# Patient Record
Sex: FEMALE | Race: BLACK OR AFRICAN AMERICAN | Employment: UNEMPLOYED | URBAN - METROPOLITAN AREA
[De-identification: names, ages, dates, MRNs, and addresses within clinical notes are randomized per-mention and may not be internally consistent; named-entity substitution may affect disease eponyms.]

---

## 2018-06-20 ENCOUNTER — OFFICE VISIT (OUTPATIENT)
Dept: PODIATRY | Age: 43
End: 2018-06-20
Payer: COMMERCIAL

## 2018-06-20 VITALS — WEIGHT: 190 LBS | RESPIRATION RATE: 16 BRPM | HEIGHT: 68 IN | BODY MASS INDEX: 28.79 KG/M2 | HEART RATE: 68 BPM

## 2018-06-20 DIAGNOSIS — M79.605 PAIN IN BOTH LOWER EXTREMITIES: ICD-10-CM

## 2018-06-20 DIAGNOSIS — M79.604 PAIN IN BOTH LOWER EXTREMITIES: ICD-10-CM

## 2018-06-20 DIAGNOSIS — B35.1 DERMATOPHYTOSIS OF NAIL: Primary | ICD-10-CM

## 2018-06-20 DIAGNOSIS — B35.3 TINEA PEDIS OF RIGHT FOOT: ICD-10-CM

## 2018-06-20 DIAGNOSIS — R26.2 WALKING DIFFICULTY DUE TO ANKLE AND FOOT: ICD-10-CM

## 2018-06-20 PROCEDURE — G8428 CUR MEDS NOT DOCUMENT: HCPCS | Performed by: PODIATRIST

## 2018-06-20 PROCEDURE — 4004F PT TOBACCO SCREEN RCVD TLK: CPT | Performed by: PODIATRIST

## 2018-06-20 PROCEDURE — G8419 CALC BMI OUT NRM PARAM NOF/U: HCPCS | Performed by: PODIATRIST

## 2018-06-20 PROCEDURE — 11721 DEBRIDE NAIL 6 OR MORE: CPT | Performed by: PODIATRIST

## 2018-06-20 PROCEDURE — 99203 OFFICE O/P NEW LOW 30 MIN: CPT | Performed by: PODIATRIST

## 2018-06-20 RX ORDER — CLOTRIMAZOLE AND BETAMETHASONE DIPROPIONATE 10; .64 MG/G; MG/G
CREAM TOPICAL
Qty: 45 G | Refills: 2 | Status: SHIPPED | OUTPATIENT
Start: 2018-06-20

## 2018-07-12 ENCOUNTER — OFFICE VISIT (OUTPATIENT)
Dept: FAMILY MEDICINE CLINIC | Age: 43
End: 2018-07-12
Payer: COMMERCIAL

## 2018-07-12 VITALS
SYSTOLIC BLOOD PRESSURE: 106 MMHG | TEMPERATURE: 98.6 F | WEIGHT: 191 LBS | HEART RATE: 93 BPM | HEIGHT: 66 IN | DIASTOLIC BLOOD PRESSURE: 70 MMHG | BODY MASS INDEX: 30.7 KG/M2

## 2018-07-12 DIAGNOSIS — I63.9 CEREBROVASCULAR ACCIDENT (CVA), UNSPECIFIED MECHANISM (HCC): Primary | ICD-10-CM

## 2018-07-12 PROCEDURE — 99213 OFFICE O/P EST LOW 20 MIN: CPT | Performed by: STUDENT IN AN ORGANIZED HEALTH CARE EDUCATION/TRAINING PROGRAM

## 2018-07-12 PROCEDURE — 99203 OFFICE O/P NEW LOW 30 MIN: CPT | Performed by: STUDENT IN AN ORGANIZED HEALTH CARE EDUCATION/TRAINING PROGRAM

## 2018-07-12 PROCEDURE — G8598 ASA/ANTIPLAT THER USED: HCPCS | Performed by: STUDENT IN AN ORGANIZED HEALTH CARE EDUCATION/TRAINING PROGRAM

## 2018-07-12 PROCEDURE — G8427 DOCREV CUR MEDS BY ELIG CLIN: HCPCS | Performed by: STUDENT IN AN ORGANIZED HEALTH CARE EDUCATION/TRAINING PROGRAM

## 2018-07-12 PROCEDURE — 1036F TOBACCO NON-USER: CPT | Performed by: STUDENT IN AN ORGANIZED HEALTH CARE EDUCATION/TRAINING PROGRAM

## 2018-07-12 PROCEDURE — G8417 CALC BMI ABV UP PARAM F/U: HCPCS | Performed by: STUDENT IN AN ORGANIZED HEALTH CARE EDUCATION/TRAINING PROGRAM

## 2018-07-12 RX ORDER — ASPIRIN 81 MG/1
81 TABLET ORAL DAILY
Qty: 30 TABLET | Refills: 3 | Status: SHIPPED | OUTPATIENT
Start: 2018-07-12 | End: 2019-09-17 | Stop reason: SDUPTHER

## 2018-07-12 ASSESSMENT — ENCOUNTER SYMPTOMS
SORE THROAT: 0
DIARRHEA: 0
NAUSEA: 0
SHORTNESS OF BREATH: 0
WHEEZING: 0
RHINORRHEA: 0
ABDOMINAL PAIN: 0
TROUBLE SWALLOWING: 0
ABDOMINAL DISTENTION: 0
APNEA: 0
VOMITING: 0
CHEST TIGHTNESS: 0
CONSTIPATION: 0
COUGH: 0
BLOOD IN STOOL: 0
EYE PAIN: 0

## 2018-07-12 ASSESSMENT — PATIENT HEALTH QUESTIONNAIRE - PHQ9
2. FEELING DOWN, DEPRESSED OR HOPELESS: 1
SUM OF ALL RESPONSES TO PHQ9 QUESTIONS 1 & 2: 1
1. LITTLE INTEREST OR PLEASURE IN DOING THINGS: 0
SUM OF ALL RESPONSES TO PHQ QUESTIONS 1-9: 1

## 2018-07-12 NOTE — PATIENT INSTRUCTIONS
Thank you for letting us take care of you today. We hope all your questions were addressed. If a question was overlooked or something else comes to mind after you return home, please contact a member of your Care Team listed below. Please make sure you have a routine office visit set up to follow-up on [address]. Your Care Team at [place] [address] is Team #4  Mac Couch MD (Faculty)  MD Caitlyn Ambriz MD (Resident)  Amrik Huizar MD (Resident)  Coby Escamilla MD (Resident)  Jonh Abbott MD (Resident)  Katie Guzman MD (Resident)  SVITLANA Lucio., AVA Shaffer., AVA Azar ([facility])  Malka Sky RN, ([id number] [facility])  Kenny Miller, Ph.D., (Behavioral Services)  Raymond Hicks, [address] [place] [address] (Clinical Pharmacist)       Office phone number: [phone]    If you need to get in right away due to illness, please be advised we have \"Same Day\" appointments available Monday-Friday. Please call us at [phone] option #3 to schedule your \"Same Day\" appointment.

## 2018-07-12 NOTE — PROGRESS NOTES
Heart Disease Mother     No Known Problems Father     No Known Problems Sister     No Known Problems Brother        Objective:    /70 (Site: Left Arm, Position: Sitting, Cuff Size: Medium Adult) Comment: MAchine  Pulse 93   Temp 98.6 °F (37 °C) (Oral)   Ht 5' 5.75\" (1.67 m)   Wt 191 lb (86.6 kg)   BMI 31.06 kg/m²    BP Readings from Last 3 Encounters:   07/12/18 106/70       Physical Exam   Constitutional: She is oriented to person, place, and time. She appears well-developed and well-nourished. No distress. HENT:   Head: Normocephalic and atraumatic. Right Ear: External ear normal.   Left Ear: External ear normal.   Eyes: Pupils are equal, round, and reactive to light. No scleral icterus. Cardiovascular: Normal rate, regular rhythm and normal heart sounds. Pulmonary/Chest: Effort normal and breath sounds normal. No respiratory distress. She has no wheezes. Abdominal: Soft. Bowel sounds are normal. She exhibits no distension. There is no tenderness. Musculoskeletal: She exhibits no edema. Lymphadenopathy:     She has no cervical adenopathy. Neurological: She is alert and oriented to person, place, and time. No cranial nerve deficit. She exhibits abnormal muscle tone (decreased tone on right side of the body). Coordination abnormal.   Skin: Skin is warm and dry. She is not diaphoretic. Psychiatric: She has a normal mood and affect. Nursing note and vitals reviewed. Lab Results   Component Value Date    WBC 5.0 02/10/2012    HGB 11.6 (L) 02/10/2012    HCT 35.3 (L) 02/10/2012     02/10/2012     02/10/2012    K 4.2 02/10/2012     (H) 02/10/2012    CREATININE 0.63 02/10/2012    BUN 8 02/10/2012    CO2 21 02/10/2012    TSH 1.62 02/10/2012     Lab Results   Component Value Date    CALCIUM 9.4 02/10/2012     No results found for: LDLCALC, LDLCHOLESTEROL, LDLDIRECT    Assessment and Plan:    1.  Cerebrovascular accident (CVA), unspecified mechanism ([facility] [address]  - [facility] - [facility]  - [facility]  - aspirin EC 81 MG EC tablet; Take 1 tablet by mouth daily  Dispense: 30 tablet; Refill: 3  - Lipid Panel  - Hemoglobin A1C          Requested Prescriptions     Pending Prescriptions Disp Refills    aspirin EC 81 MG EC tablet 30 tablet 3     Sig: Take 1 tablet by mouth daily       There are no discontinued medications. Opal Shepherd received counseling on the following healthy behaviors: nutrition, exercise and medication adherence    Discussed use, benefit, and side effects of prescribed medications. Barriers to medication compliance addressed. All patient questions answered. Pt voiced understanding. Return in about 4 weeks (around 8/9/2018) for follow up cholesterol. Disclaimer: Some or all of this note was transcribed using voice-recognition software. This may cause typographical errors occasionally. Although all effort is made to fix these errors, please do not hesitate to contact our office if there is any concern with the understanding of this note.

## 2018-07-18 ENCOUNTER — OFFICE VISIT (OUTPATIENT)
Dept: PODIATRY | Age: 43
End: 2018-07-18
Payer: COMMERCIAL

## 2018-07-18 VITALS — HEIGHT: 68 IN | HEART RATE: 68 BPM | RESPIRATION RATE: 16 BRPM | BODY MASS INDEX: 28.79 KG/M2 | WEIGHT: 190 LBS

## 2018-07-18 DIAGNOSIS — M79.604 PAIN IN BOTH LOWER EXTREMITIES: ICD-10-CM

## 2018-07-18 DIAGNOSIS — L60.0 INGROWN NAIL: ICD-10-CM

## 2018-07-18 DIAGNOSIS — B35.1 DERMATOPHYTOSIS OF NAIL: Primary | ICD-10-CM

## 2018-07-18 DIAGNOSIS — M79.605 PAIN IN BOTH LOWER EXTREMITIES: ICD-10-CM

## 2018-07-18 DIAGNOSIS — B35.3 TINEA PEDIS OF BOTH FEET: ICD-10-CM

## 2018-07-18 PROCEDURE — G8427 DOCREV CUR MEDS BY ELIG CLIN: HCPCS | Performed by: PODIATRIST

## 2018-07-18 PROCEDURE — G8598 ASA/ANTIPLAT THER USED: HCPCS | Performed by: PODIATRIST

## 2018-07-18 PROCEDURE — 1036F TOBACCO NON-USER: CPT | Performed by: PODIATRIST

## 2018-07-18 PROCEDURE — G8417 CALC BMI ABV UP PARAM F/U: HCPCS | Performed by: PODIATRIST

## 2018-07-18 PROCEDURE — 11721 DEBRIDE NAIL 6 OR MORE: CPT | Performed by: PODIATRIST

## 2018-07-20 ENCOUNTER — HOSPITAL ENCOUNTER (OUTPATIENT)
Dept: PHYSICAL THERAPY | Age: 43
Setting detail: THERAPIES SERIES
Discharge: HOME | End: 2018-07-20
Payer: COMMERCIAL

## 2018-07-20 ENCOUNTER — HOSPITAL ENCOUNTER (OUTPATIENT)
Dept: SPEECH THERAPY | Age: 43
Setting detail: THERAPIES SERIES
Discharge: HOME | End: 2018-07-20
Payer: COMMERCIAL

## 2018-07-20 DIAGNOSIS — I69.320 APHASIA AS LATE EFFECT OF CEREBROVASCULAR ACCIDENT: Primary | ICD-10-CM

## 2018-07-20 PROCEDURE — G8985 CARRY GOAL STATUS: HCPCS

## 2018-07-20 PROCEDURE — G9163 LANG EXPRESS GOAL STATUS: HCPCS

## 2018-07-20 PROCEDURE — G8984 CARRY CURRENT STATUS: HCPCS

## 2018-07-20 PROCEDURE — G9162 LANG EXPRESS CURRENT STATUS: HCPCS

## 2018-07-20 PROCEDURE — 97162 PT EVAL MOD COMPLEX 30 MIN: CPT

## 2018-07-20 PROCEDURE — 92523 SPEECH SOUND LANG COMPREHEN: CPT

## 2018-07-20 NOTE — PROGRESS NOTES
symmetry     Score: [] 0 [x] 1  Right and left step length not equal = 0  Right and left step length appear equal = 1    Step continuity     Score: [] 0 [x] 1  Stopping or discontinuity between steps = 0  Steps appear continuous = 1    Path (Excursion)     Score: [] 0 [] 1 [x] 2  Marked deviation = 0  Mild/moderate deviation or uses w. aid = 1  Straight without w. aid = 2    Trunk       Score: [] 0 [] 1 [x] 2  Marked sway or uses w. aid = 0  No sway but flex. knees or back or  uses arms for stability = 1  No sway, flex. , use of arms or w. aid = 2    Walking time     Score: [] 0 [x] 1  Heels apart = 0  Heels almost touching while walking = 1    Gait Total Score     Score:         12/12              TOTAL SCORE = BALANCE + GAIT  Score:        28 /28         Risk Indicators:   Score 18 or less = High risk of falls  Score 19-23 = Moderate risk of falls  Score 24 or more = Low risk of falls    Emilia ME, Corbin TF, Roddy R, Fall Risk Index for elderly patients based on number of chronic disabilities.   Am J Med [id number]

## 2018-07-20 NOTE — CONSULTS
average  2. Pt to increase ROM of R shoulder flex 135, abd 110, ER 50 Wrist ext 45 UD 25, Forearm pronation/supination 60  3. Pt to increase strength with shoulder flex/abd/er 4/5, IR 4+/5, elbow flex 5/5, ext 4+/5, forearm sup 4+/5 pro 4+/5 wrist flex 4-/5 ext 4/5 RD 4-/5 UD 4/5 EPL 3+/5 finger abd 4-/5,  10lb, pinch/lateral pinch 5lb   4. Pt to report decreased difficulty with reaching and grabbing an object. Will defer goals for Hand if OT ordered for PT. Patient goals: \"to get better\"    G-CODE    Functional Limitation: Carrying, Moving, and Handling objects  Functional Assessment Used: Therapist Discretion and Pt report  Current Status Modifier: CL  Goal Status Modifier: CK  Discharge Status Modifier:     Rehab Potential:  [] Good  [x] Fair  [] Poor   Suggested Professional Referral:  [] No  [x] Yes: Pt would benefit from OT to work on fine motor skills. Barriers to Goal Achievement[de-identified]  [x] No  [] Yes:  Domestic Concerns:  [x] No  [] Yes:    Pt. Education:  [x] Plans/Goals, Risks/Benefits discussed  [] Home exercise program  Method of Education: [x] Verbal  [] Demo  [] Written  Comprehension of Education: Pt informed of the areas that were weak and would be working on in PT   [x] [employer] understanding. [x] Demonstrates understanding. [] Needs Review. [] Demonstrates/verbalizes understanding of HEP/Ed previously given. Treatment Plan:  [x] Therapeutic Exercise    [x] Modalities:  [x] Therapeutic Activity    [x] Ultrasound  [x] Electrical Stimulation  [] Gait Training     [x] Massage      [] Lumbar/Cervical Traction  [x] Neuromuscular Re-education [x] Cold/hotpack [] Iontophoresis: 4 mg/mL  [x] Instruction in HEP             Dexamethasone Sodium  [x] Manual Therapy             Phosphate 40-80 mAmin  [] Aquatic Therapy    [] Dry Needling [] Other:    []  Medication allergies reviewed for use of    Dexamethasone Sodium Phosphate 4mg/ml     with iontophoresis treatments.    Pt is not

## 2018-07-23 ENCOUNTER — HOSPITAL ENCOUNTER (OUTPATIENT)
Dept: SPEECH THERAPY | Age: 43
Setting detail: THERAPIES SERIES
Discharge: HOME | End: 2018-07-23
Payer: COMMERCIAL

## 2018-07-23 ENCOUNTER — HOSPITAL ENCOUNTER (OUTPATIENT)
Dept: PHYSICAL THERAPY | Age: 43
Setting detail: THERAPIES SERIES
Discharge: HOME | End: 2018-07-23
Payer: COMMERCIAL

## 2018-07-23 PROCEDURE — 92507 TX SP LANG VOICE COMM INDIV: CPT

## 2018-07-23 PROCEDURE — 97110 THERAPEUTIC EXERCISES: CPT

## 2018-07-23 NOTE — FLOWSHEET NOTE
[] Rula Stephen       Outpatient Physical        Therapy       [address]       Phone: [phone]       Fax: [phone] [] [facility] at [address] [facility]       Phone: [phone]       Fax: [phone] [] [place]. [address] [place] [address]   Phone: [phone]   Fax:  [phone]     Physical Therapy Daily Treatment Note    Date:  2018  Patient Name:  Gonzalo Hanson    :  1975  MRN: [id number]  Physician: Bhakti Garcia MD, Harjinder Branham D.O. Insurance: Medicare  Medical Diagnosis: CVA, unspecified mechanism I63.9                 Rehab Codes: M62.81, M25.611, M25.621, M25.631, M25.641, M79.602, R29.3  Onset date: 10/1/2016                       Next 's appt. :   Visit# / total visits:   Cancels/No Shows: 0/0    Subjective:    Pain:  [x] Yes  [] No           Location: entire R UE  Pain Rating: (0-10 scale) 10/10 currently and on average  Pain altered Tx:  [x] No  [] Yes  Action:  Comments:  Patient arrived noting no change in pain or symptoms in R UE and demonstrating R UE flexion synergy. Objective:  Modalities:   Precautions:  Exercises:  Exercise Reps/ Time Weight/ Level Comments   Supine   cane   Chest press 10x     Protraction 10x     ER 10x           Standing      Wall slides 10x ea  Flexion, abduction. Finger ladders --  Flexion only attempted . Pt with difficulty abducting/adducting fingers, may add in as finger ab/aduction increases to perform with proper form. Seated      Mat towel slides 10x ea  Sammy UE.  Flexion, CW, CCW   Wrist maze 2x through  Patient provided self tactile cuing after demo by therapist to keep elbow tucked into side to keep motions at the wrist. 2nd attempt much more difficult than 1st.          Other:    Specific Instructions for next treatment:    Treatment Charges: Mins Units   []

## 2018-07-25 ENCOUNTER — HOSPITAL ENCOUNTER (OUTPATIENT)
Dept: PHYSICAL THERAPY | Age: 43
Setting detail: THERAPIES SERIES
Discharge: HOME | End: 2018-07-25
Payer: COMMERCIAL

## 2018-07-25 DIAGNOSIS — Z86.73 STATUS POST CVA: Primary | ICD-10-CM

## 2018-07-25 PROCEDURE — 97110 THERAPEUTIC EXERCISES: CPT

## 2018-08-01 ENCOUNTER — HOSPITAL ENCOUNTER (OUTPATIENT)
Dept: SPEECH THERAPY | Age: 43
Setting detail: THERAPIES SERIES
Discharge: HOME | End: 2018-08-01
Payer: COMMERCIAL

## 2018-08-01 ENCOUNTER — HOSPITAL ENCOUNTER (OUTPATIENT)
Dept: OCCUPATIONAL THERAPY | Age: 43
Setting detail: THERAPIES SERIES
Discharge: HOME | End: 2018-08-01
Payer: COMMERCIAL

## 2018-08-01 ENCOUNTER — HOSPITAL ENCOUNTER (OUTPATIENT)
Dept: PHYSICAL THERAPY | Age: 43
Setting detail: THERAPIES SERIES
Discharge: HOME | End: 2018-08-01
Payer: COMMERCIAL

## 2018-08-01 PROCEDURE — 97110 THERAPEUTIC EXERCISES: CPT

## 2018-08-01 PROCEDURE — 97165 OT EVAL LOW COMPLEX 30 MIN: CPT

## 2018-08-01 PROCEDURE — 92507 TX SP LANG VOICE COMM INDIV: CPT

## 2018-08-01 NOTE — CONSULTS
[] With Sedentary activity    Pain Altered Tx: []Yes []No  Action Taken:    Objective:  Tests/Measurements:  Current Functional Level:  93% functionally impaired as measured with the DASH Functional Survey. 0-100 scale, with 0 = no Deficits   STRENGTH      RIGHT LEFT    2.5 42   Lateral pinch 2.5 14   2 point pinch 1 8   3 jaw pinch Not able 10     DIGITS         Extension/Flexion   INDEX MIDDLE RING LITTLE   MCP +20/60 +30/65 +30/90 +30/92   PIP -10/80 -20/75 -23/75 -23/80   DIP +10/46 +10/42 +10/45 +15/75            COORDINATION  - Fine Motor UNABLE TO TEST AT THIS TIME     Right in seconds Percentile Left in seconds Percentile   9 Hole Peg Test         SHOULDER      Right ROM Right Strength   Flexion 83 4-/5   Extension 30 4-/5   Abduction 64 4-/5   Adduction 20 4-/5         Problems:     [x] Pain       [x] ROM      [x] Strength  [x] Function         [] Coordination    [] Sensation                Short Term Goals: (  9    Treatments)  1. Decrease Pain: to 6/10 with simple ADLS  2. Increase A/P ROM (degrees): of R Shoulder to 100 for increased I with ADLs  3. Increase strength (pounds): increase R  strength to 7 for increased I with [address] tasks  4. Increase function:  DASH score will be 80% functionally impaired or less  5. Independent with Home Exercise Program in 3 sessions      Long Term Goals: (  18  Treatments)  1. Pt to increase R UE ROM   1. All MCPs to 90 flexion  2. All DIPs to 90 flexion  3. All PIPs to 75 degrees flexion  4. Decrease extensor lag of all DIPs and PIPs to -5 or less  2. Increase R  strength to 15 for increased I with [address] tasks      Patient Goals: to get my hand back    Treatment Potential: [x]Good []Fair []Poor  Suggested Professional Referral: []Yes [x]No  Domestic Concerns: []Yes [x]No   Barriers to Goal Achievement: []Yes [x] No    Comments/Assessment: Pt presents s/p stroke resulting in R UE ataxia.  Pt demo distinct nerve/motor distribution and division of the middle and ring fingers. Pt demo claw hand pattern when grasping for small objects. Pt demo minimal tone in shoulder and elbow allowing for fluid movement at times. Pt demo decreased control distal to elbow in wrist and digits. Pt wishes to regain full function and return to working functional member of society. Pt could benefit from Valorie Sierra 3 for spasticity. BY signing this note you are in agreement to a PT doing DRY NEEDLING. Home Program Initiated:   [ x ] Written    [x] Verbal    [ x ] Demo   Comprehension of Education: [x] Yes [] No [] Needs Review  [x]Plans /[x] Goals, []Risks/ [] Benefits discussed with [x] Patient []Family    Treatment Plan:  Frequency/Duration:     2  Times a week, for    18  Visits. [x] Therapeutic Exercise 79876 [] Electrical Stim [id number]    [x]Neuro Re-Ed  54525  [x] Written Home Program    [] Iontophoresis 31165  [x] Therapeutic Activities 19825  [] Manual Therapy 00266  [x] Manual Therapy 78084  [] Ultrasound 93702   [] Hot Pack/Cold Pack 62574  [] Attended Electrical Stim 46773 [x] Massage 17352    [] Ortho Fit/Train [id number]  [] Ortho Re-Fit/Check  [id number]          Treatment This Date:  [x] Eval        Min. 15    [x]  Low Complexity   []  Moderate Complexity   []  High Complexity  []  Re-Evaluation Min. [x] Therapeutic Exercise         Min. 39   [] ADL                          Min.  [] Therapeutic Activity            Min.        Flow Sheet:  Exercise Reps/Time Weight/Level Comments   Cones, pronation/supination 10  Initiated this date   Grasp and release foam blocks 1/2 series  Initiated this date                               Evaluation Complexity:  History (Personal factors, comorbidities) [x]  0 []  1-2 []  3+   Exam (limitations, restrictions) [x]  1-2 []  3 []  4+   Decision Making [x]  Low []  Moderate []  High   ?  [x]  Low Complexity []  Moderate Complexity []  High Complexity               Total Treatment Time: 45    Time In: 0900   Time Out: 1000      Electronically signed by LAQUITA Wheeler, OTSANTANA/L on 8/1/2018 at 8:57 AM        Physician Signature: _________________________ Date: _______________  By signing above or cosigning this note, I have reviewed this plan of care and certify a need for medically necessary rehabilitation services.      *PLEASE SIGN ABOVE AND FAX BACK ALL PAGES*

## 2018-08-01 NOTE — FLOWSHEET NOTE
[x] [facility]       Outpatient Physical        Therapy       [address]       Phone: [phone]       Fax: [phone] [] [facility] at [address]       Phone: [phone]       Fax: [phone] [] [place]. [address] [place] [address] [facility]  [address] [place] [address]   Phone: [phone]   Fax:  [phone]     Physical Therapy Daily Treatment Note    Date:  2018  Patient Name:  Kala Yan    :  1975  MRN: [id number]  Physician: Lb Ivan MD, Patricia Syed DLuis FO. Insurance: Medicare  Medical Diagnosis: CVA, unspecified mechanism I63.9                 Rehab Codes: M62.81, M25.611, M25.621, M25.631, M25.641, M79.602, R29.3  Onset date: 10/1/2016                       Next 's appt. :   Visit# / total visits:   Cancels/No Shows: 0/0    Subjective:    Pain:  [x] Yes  [] No           Location: entire R UE  Pain Rating: (0-10 scale) 10/10 currently and on average  Pain altered Tx:  [x] No  [] Yes  Action:  Comments:  Patient is doing fair, reports of same constant pain. Objective:  Modalities:   Precautions:  Exercises:  Exercise Reps/ Time Weight/ Level Comments   Scifit  3 min  L1 Difficulty holding hand rails          Supine      Chest press 10x 2lb    protraction 10x 2lb    Shoulder Flex 10x 2 lb    Standing      Wall slides 10x ea  Flexion, abduction.    Ball roll on wall  10x  Flex   Finger ladder   8 levels  Difficulty with finger movement   Wand exercises      -Flex 10x 1lb    -abd 10x 1lb    -ER/IR  10x 1lb Cues to keeps elbows at sides    -bicep curl  10x 1lb          Seated      Mat towel slides 10x ea  With orange slider, Flexion, abd  CW, CCW   Wrist maze 2x through  Dropped maze 1x held    [employer] 1'x2 yellow Difficulty holding ball held   Other:    Specific Instructions for next treatment: Progress ROM and strengthening exercises, add elbow ext exercises and wrist ext exercises. Treatment Charges: Mins Units   []  Modalities     [x]  Ther Exercise 45 3   []  Manual Therapy     []  Ther Activities     []  Aquatics     []  Vasocompression     []  Other     Total Treatment time 45 3       Assessment: [x] Progressing toward goals. Pt demonstrating difficultly and some frustration with new exercise. Pt presenting with difficulty holding objects and finger movements. Pt having no difficulty with added weight during supine exercises. Pt needing verbal and tactile cues to achieve full elbow extension. [] No change. [] Other:    STG: (to be met in 10 treatments)  1. ? Pain: Pt to report decrease in pn in RUE to be 8/10 or less on average  2. ? ROM: Pt to increase ROM of R Shoulder flex 120, abd 90, ER 35, IR 80 ELBOW ext 5, WRIST flex 75 ext 35, RD 18, UD 15, Forearm pronation/supination 45   3. ? Strength: Pt to increase strength with shoulder flex/abd/er 4-/5, IR 4/5, elbow flex 4+/5, ext 4/5, forearm sup 4/5 pro 4-/5 wrist flex 3+/5 ext 4-/5 RD 3+/5 UD 4-/5 EPL 3/5 finger abd 3+/5,  5lb, pinch/lateral pinch 1lb  4. ? Function: Pt to report increased use of B UE with functional tasks at home such as cooking  5. Independent with Home Exercise Programs  6. Pt to demonstrate and verbalize correct posture  LTG: (to be met in 20 treatments)  1. Pt to report decreased in pn in RUE to be 6/10 or less on average  2. Pt to increase ROM of R shoulder flex 135, abd 110, ER 50 Wrist ext 45 UD 25, Forearm pronation/supination 60  3. Pt to increase strength with shoulder flex/abd/er 4/5, IR 4+/5, elbow flex 5/5, ext 4+/5, forearm sup 4+/5 pro 4+/5 wrist flex 4-/5 ext 4/5 RD 4-/5 UD 4/5 EPL 3+/5 finger abd 4-/5,  10lb, pinch/lateral pinch 5lb   4. Pt to report decreased difficulty with reaching and grabbing an object. G-CODE     Functional Limitation: Carrying, Moving, and Handling objects  Functional Assessment Used:  Therapist Discretion and Pt report  Current Status Modifier: CL  Goal Status Modifier: CK  Discharge Status Modifier:       Pt. Education:  [x] Yes  [] No  [] Reviewed Prior HEP/Ed  Method of Education: [x] Verbal  [x] Demo  [] Written  Comprehension of Education: Educated Pt that she could use her exercise balls to squeeze at home to work on  strength and use a towel or pillow case for wall slides. [x] Verbalizes understanding. [] Demonstrates understanding. [x] Needs review. [] Demonstrates/verbalizes HEP/Ed previously given. Plan: [x] Continue per plan of care. [] Other:        Frequency: 3 x/weeks for 20 visits    Time In: 1000          Time Out: 1045    Electronically signed by Enoch Bella.  MARTY Abbott on 8/1/2018 at 11:17 AM

## 2018-08-03 ENCOUNTER — HOSPITAL ENCOUNTER (OUTPATIENT)
Dept: OCCUPATIONAL THERAPY | Age: 43
Setting detail: THERAPIES SERIES
Discharge: HOME | End: 2018-08-03
Payer: COMMERCIAL

## 2018-08-03 ENCOUNTER — HOSPITAL ENCOUNTER (OUTPATIENT)
Dept: PHYSICAL THERAPY | Age: 43
Setting detail: THERAPIES SERIES
Discharge: HOME | End: 2018-08-03
Payer: COMMERCIAL

## 2018-08-03 PROCEDURE — 97110 THERAPEUTIC EXERCISES: CPT

## 2018-08-03 NOTE — FLOWSHEET NOTE
fairly good placement and control; hand control deficit is more pronounced. Specific Instructions for Next Treatment:    Treatment Charges: Mins Units   []  Modalities     [x]  Ther Exercise 50 3   []  Manual Therapy     []  Ther Activities     []  Orthotic fitting/training     []  Orthotic re-check     []  Other         Assessment: [x] Progressing toward goals. [] No change. [] Other:    Short Term Goals: (  9    Treatments)  1. Decrease Pain: to 6/10 with simple ADLS  2. Increase A/P ROM (degrees): of R Shoulder to 100 for increased I with ADLs  3. Increase strength (pounds): increase R  strength to 7 for increased I with [facility] tasks  4. Increase function:  DASH score will be 80% functionally impaired or less  5. Independent with Home Exercise Program in 3 sessions        Long Term Goals: (  18  Treatments)  1. Pt to increase R UE ROM         1. All MCPs to 90 flexion  2. All DIPs to 90 flexion  3. All PIPs to 75 degrees flexion  4. Decrease extensor lag of all DIPs and PIPs to -5 or less  2. Increase R  strength to 15 for increased I with [facility] tasks        Patient Goals: To get my hand back    Pt. Education:  [x] Yes  [] No  [x] Reviewed Prior HEP/Ed  Method of Education: [x] Verbal  [x] Demo  [] Written  Re:  Comprehension of Education:  [] Verbalizes understanding. [x] Demonstrates understanding. [] Needs review. [x] Demonstrates/verbalizes HEP/Ed previously given. Treatment Plan: 2 times a week for 18 visits to improve gross/fine motor coordination of RUE. Time In/Out: 1305 - 1355  Total Treatment Time:  48  Min.       Electronically signed by:  AYANA Ortega/CHOCO, JUSTINO

## 2018-08-03 NOTE — FLOWSHEET NOTE
3+/5 finger abd 4-/5,  10lb, pinch/lateral pinch 5lb   4. Pt to report decreased difficulty with reaching and grabbing an object. G-CODE     Functional Limitation: Carrying, Moving, and Handling objects  Functional Assessment Used: Therapist Discretion and Pt report  Current Status Modifier: CL  Goal Status Modifier: CK  Discharge Status Modifier:       Pt. Education:  [x] Yes  [] No  [x] Reviewed Prior HEP/Ed  Method of Education: [x] Verbal  [] Demo  [] Written  Comprehension of Education: Educated Pt that she could use her exercise balls to squeeze at home to work on  strength and use a towel or pillow case for wall slides. [x] Verbalizes understanding. [] Demonstrates understanding. [x] Needs review. [] Demonstrates/verbalizes HEP/Ed previously given. Plan: [x] Continue per plan of care.    [] Other:        Frequency: 3 x/weeks for 20 visits    Time In: 1400 Time Out: 1500    Electronically signed by Donovan Garcia PT on 8/3/2018 at 2:02 PM

## 2018-08-06 ENCOUNTER — HOSPITAL ENCOUNTER (OUTPATIENT)
Age: 43
Setting detail: SPECIMEN
Discharge: HOME | End: 2018-08-06
Payer: COMMERCIAL

## 2018-08-06 LAB
CHOLESTEROL/HDL RATIO: 3
CHOLESTEROL: 124 MG/DL
ESTIMATED AVERAGE GLUCOSE: 100 MG/DL
HBA1C MFR BLD: 5.1 % (ref 4–6)
HDLC SERPL-MCNC: 42 MG/DL
LDL CHOLESTEROL: 65 MG/DL (ref 0–130)
TRIGL SERPL-MCNC: 83 MG/DL
VLDLC SERPL CALC-MCNC: NORMAL MG/DL (ref 1–30)

## 2018-08-07 ENCOUNTER — OFFICE VISIT (OUTPATIENT)
Dept: FAMILY MEDICINE CLINIC | Age: 43
End: 2018-08-07
Payer: COMMERCIAL

## 2018-08-07 VITALS
SYSTOLIC BLOOD PRESSURE: 113 MMHG | BODY MASS INDEX: 28.92 KG/M2 | WEIGHT: 190.8 LBS | TEMPERATURE: 97.9 F | HEART RATE: 85 BPM | DIASTOLIC BLOOD PRESSURE: 70 MMHG | HEIGHT: 68 IN

## 2018-08-07 DIAGNOSIS — Z86.73 STATUS POST CVA: Primary | ICD-10-CM

## 2018-08-07 PROCEDURE — G8427 DOCREV CUR MEDS BY ELIG CLIN: HCPCS | Performed by: STUDENT IN AN ORGANIZED HEALTH CARE EDUCATION/TRAINING PROGRAM

## 2018-08-07 PROCEDURE — 1036F TOBACCO NON-USER: CPT | Performed by: STUDENT IN AN ORGANIZED HEALTH CARE EDUCATION/TRAINING PROGRAM

## 2018-08-07 PROCEDURE — 99213 OFFICE O/P EST LOW 20 MIN: CPT | Performed by: STUDENT IN AN ORGANIZED HEALTH CARE EDUCATION/TRAINING PROGRAM

## 2018-08-07 PROCEDURE — G8598 ASA/ANTIPLAT THER USED: HCPCS | Performed by: STUDENT IN AN ORGANIZED HEALTH CARE EDUCATION/TRAINING PROGRAM

## 2018-08-07 PROCEDURE — G8417 CALC BMI ABV UP PARAM F/U: HCPCS | Performed by: STUDENT IN AN ORGANIZED HEALTH CARE EDUCATION/TRAINING PROGRAM

## 2018-08-07 NOTE — PROGRESS NOTES
Subjective:    Ruthy Regalado is a 37 y.o. female with  has a past medical history of Chronic back pain; Obesity; and Stroke ([address] Presented to the office today for:  Chief Complaint   Patient presents with    Follow-up     cholesterol    Other     needs letter to [place] for fan for pathway crisis       HPI    Patient is here today to f/u on lab work and PT/Speech Therapy which was initiated after her last visit in regards to her CVA in 2016. Patient is on an aspirin. Her BP is within normal limits and her lipid panel and a1c yesterday were unremarkable. Also requesting fan due to humidity. Review of Systems  Constitutional: Positive for activity change. Negative for chills, diaphoresis, fatigue and fever. HENT: Positive for drooling. Negative for rhinorrhea, sore throat and trouble swallowing. Eyes: Negative for pain and visual disturbance. Respiratory: Negative for apnea, cough, chest tightness, shortness of breath and wheezing. Cardiovascular: Negative for chest pain, palpitations and leg swelling. Gastrointestinal: Negative for abdominal distention, abdominal pain, blood in stool, constipation, diarrhea, nausea and vomiting. Genitourinary: Negative for dysuria, flank pain and hematuria. Musculoskeletal: Negative for arthralgias and myalgias. Neurological: Positive for speech difficulty and weakness (right sided). Negative for dizziness, seizures, syncope, facial asymmetry, light-headedness, numbness and headaches. Psychiatric/Behavioral: Negative for agitation, confusion and sleep disturbance. The patient is not nervous/anxious.               The patient has a   Family History   Problem Relation Age of Onset    Hearing Loss Mother     Diabetes Mother     High Blood Pressure Mother     Heart Disease Mother     No Known Problems Father     No Known Problems Sister     No Known Problems Brother        Objective:    /70 (Site: Left Arm, Position: Sitting, Cuff Size: Large Prescriptions      No prescriptions requested or ordered in this encounter       There are no discontinued medications. Lila Castillo received counseling on the following healthy behaviors: nutrition, exercise and medication adherence    Discussed use, benefit, and side effects of prescribed medications. Barriers to medication compliance addressed. All patient questions answered. Pt voiced understanding. Return in about 6 months (around 2/7/2019) for health maint, health maintenance. Disclaimer: Some or all of this note was transcribed using voice-recognition software. This may cause typographical errors occasionally. Although all effort is made to fix these errors, please do not hesitate to contact our office if there is any concern with the understanding of this note.

## 2018-08-07 NOTE — PROGRESS NOTES
Visit Information    Have you changed or started any medications since your last visit including any over-the-counter medicines, vitamins, or herbal medicines? no   Have you stopped taking any of your medications? Is so, why? -  no  Are you having any side effects from any of your medications? - no    Have you seen any other physician or provider since your last visit?  no   Have you had any other diagnostic tests since your last visit?  no   Have you been seen in the emergency room and/or had an admission in a hospital since we last saw you?  no   Have you had your routine dental cleaning in the past 6 months?  no     Do you have an active MyChart account? If no, what is the barrier?   No: pending    Patient Care Team:  Donovan Liz MD as PCP - General (Family Medicine)  Param Chahal DPM as Physician (Podiatry)    Medical History Review  Past Medical, Family, and Social History reviewed and does not contribute to the patient presenting condition    Health Maintenance   Topic Date Due    HIV screen  04/14/1990    DTaP/Tdap/Td vaccine (1 - Tdap) 04/14/1994    Cervical cancer screen  04/14/1996    Flu vaccine (1) 09/01/2018    Diabetes screen  08/06/2021    Lipid screen  08/06/2023

## 2018-08-08 ENCOUNTER — HOSPITAL ENCOUNTER (OUTPATIENT)
Dept: OCCUPATIONAL THERAPY | Age: 43
Setting detail: THERAPIES SERIES
Discharge: HOME | End: 2018-08-08
Payer: COMMERCIAL

## 2018-08-08 PROCEDURE — 97110 THERAPEUTIC EXERCISES: CPT

## 2018-08-08 NOTE — FLOWSHEET NOTE
[x] [facility]       Occupational Therapy             1st floor       [address], [place]         Phone: [phone]       Fax: [phone] [] [address] [facility] at            [address] [facility] , [address] [facility]     Phone: [phone]     Fax: [phone]      Occupational Therapy Daily Treatment Note    Date:  2018  Patient Name:  Tifafnie Bateman    :  1975  MRN: [id number]  Physician: Paul Rosario            Insurance: [address] [phone]---BH/ZW/ZP No precert required for initial eval and first 30 visits. For re eval and additional visits, precert required, phone [phone]. Calendar year. Not combined                Medical Diagnosis: Z86.73 S/P CVA  Rehab Codes: FM Skills Loss R29.818, Lack of coordination R27.8    Onset Date: 10-                      Next Dr. Lorena Nguyen:  Visit# / Total Visits: 3/16  Cancels/No Shows: 0/0    Subjective:    Pain:  [x] Yes  [] No Location: Entire RUE Pain Rating: (0-10 scale) 8/10  Pain altered Tx:  [x] No  [] Yes  Action: NA  Pt Comments: Pt unable to describe pain other than location. Objective:  Modalities: NA    Exercises:                                        Flow Sheet:  Exercise Reps/Time Weight/Level Comments   Cones, pronation/supination 10   Completed   Grasp and release foam cubes 1/2 series   Completed    Passive self- ranging of right fingers/thumb 10 reps    Completed. Pt educ provided: verbal, demo. Pt demo'd back accurately.     Bilat UE reach with rolling pin  As tolerated   completed   Wooden pegs without velcro 5   Initiated this date                   Comments/Assessment: Very mild flexor tone palpated at Rt elbow and wrist, mild flexor tome noted in fingers. Pt education provided in passive self-ranging of fingers to maintain full digit extension of Rt fingers and thumb. Pt demo'd back appropriately. \"Claw hand\" posturing observed with grasp and release activities when pt extends digits. All RUE motion is slow, deliberate, but with fair placement and control; hand control deficit is more pronounced. Specific Instructions for Next Treatment:    Treatment Charges: Mins Units   []  Modalities     [x]  Ther Exercise 55 4   []  Manual Therapy     []  Ther Activities     []  Orthotic fitting/training     []  Orthotic re-check     []  Other         Assessment: [x] Progressing toward goals. [] No change. [] Other:    Short Term Goals: (  9    Treatments)  1. Decrease Pain: to 6/10 with simple ADLS  2. Increase A/P ROM (degrees): of R Shoulder to 100 for increased I with ADLs  3. Increase strength (pounds): increase R  strength to 7 for increased I with [address] tasks  4. Increase function:  DASH score will be 80% functionally impaired or less  5. Independent with Home Exercise Program in 3 sessions        Long Term Goals: (  18  Treatments)  1. Pt to increase R UE ROM         1. All MCPs to 90 flexion  2. All DIPs to 90 flexion  3. All PIPs to 75 degrees flexion  4. Decrease extensor lag of all DIPs and PIPs to -5 or less  2. Increase R  strength to 15 for increased I with [address] tasks        Patient Goals: To get my hand back    Pt. Education:  [x] Yes  [] No  [x] Reviewed Prior HEP/Ed  Method of Education: [x] Verbal  [x] Demo  [] Written  Re:  Comprehension of Education:  [] Verbalizes understanding. [x] Demonstrates understanding. [] Needs review. [x] Demonstrates/verbalizes HEP/Ed previously given. Treatment Plan: 2 times a week for 18 visits to improve gross/fine motor coordination of RUE. Time In/Out: 2383-1322  Total Treatment Time:  54  Min.       Electronically signed by:  AYANA Fernandez/CHOCO

## 2018-08-10 ENCOUNTER — HOSPITAL ENCOUNTER (OUTPATIENT)
Dept: OCCUPATIONAL THERAPY | Age: 43
Setting detail: THERAPIES SERIES
Discharge: HOME | End: 2018-08-10
Payer: COMMERCIAL

## 2018-08-10 ENCOUNTER — HOSPITAL ENCOUNTER (OUTPATIENT)
Dept: PHYSICAL THERAPY | Age: 43
Setting detail: THERAPIES SERIES
Discharge: HOME | End: 2018-08-10
Payer: COMMERCIAL

## 2018-08-10 PROCEDURE — 97110 THERAPEUTIC EXERCISES: CPT

## 2018-08-10 PROCEDURE — 97112 NEUROMUSCULAR REEDUCATION: CPT

## 2018-08-10 NOTE — FLOWSHEET NOTE
[x] [facility]       Occupational Therapy             1st floor       [address], [place]         Phone: [phone]       Fax: [phone] [] [address] [facility] at            [address] [facility] , [address] [place] [address]     Phone: [phone]     Fax: [phone]      Occupational Therapy Daily Treatment Note    Date:  8/10/2018  Patient Name:  Clare Mares    :  1975  MRN: [id number]  Physician: [facility]: Smith Bright [phone]---OK/JF/CX No precert required for initial eval and first 30 visits. For re eval and additional visits, precert required, phone [phone]. Calendar year. Not combined                Medical Diagnosis: Z86.73 S/P CVA  Rehab Codes: FM Skills Loss R29.818, Lack of coordination R27.8    Onset Date: 10-                      Next Dr. Reggie Ho:  Visit# / Total Visits:   Cancels/No Shows: 0/0    Subjective:    Pain:  [x] Yes  [] No Location: Entire RUE Pain Rating: (0-10 scale) 8/10  Pain altered Tx:  [x] No  [] Yes  Action: NA  Pt Comments: Pt unable to describe pain other than location. Objective:  Modalities: NA    Exercises:                                        Flow Sheet:  Exercise Reps/Time Weight/Level Comments   Cones, pronation/supination 10   Completed   Grasp and release foam cubes 1 series   Increased this date Completed    Passive self- ranging of right fingers/thumb 10 reps    Completed. Pt educ provided: verbal, demo. Pt demo'd back accurately.     Bilat UE reach with rolling pin  As tolerated   completed   Wooden pegs without velcro 5   Not compelted                   Comments/Assessment: Very mild flexor tone palpated at Rt elbow and wrist, mild flexor tome noted in fingers. Pt education provided in passive self-ranging of fingers to maintain full digit extension of Rt fingers and thumb. Pt demo'd back appropriately.  \"Claw hand\" posturing observed with grasp and release activities when pt

## 2018-08-10 NOTE — FLOWSHEET NOTE
25, Forearm pronation/supination 60  3. Pt to increase strength with shoulder flex/abd/er 4/5, IR 4+/5, elbow flex 5/5, ext 4+/5, forearm sup 4+/5 pro 4+/5 wrist flex 4-/5 ext 4/5 RD 4-/5 UD 4/5 EPL 3+/5 finger abd 4-/5,  10lb, pinch/lateral pinch 5lb   4. Pt to report decreased difficulty with reaching and grabbing an object. G-CODE     Functional Limitation: Carrying, Moving, and Handling objects  Functional Assessment Used: Therapist Discretion and Pt report  Current Status Modifier: CL  Goal Status Modifier: CK  Discharge Status Modifier:       Pt. Education:  [x] Yes  [] No  [x] Reviewed Prior HEP/Ed  Method of Education: [x] Verbal  [] Demo  [] Written  Comprehension of Education: Educated Pt that she could use her exercise balls to squeeze at home to work on  strength and use a towel or pillow case for wall slides. [x] Verbalizes understanding. [] Demonstrates understanding. [x] Needs review. [] Demonstrates/verbalizes HEP/Ed previously given. Plan: [x] Continue per plan of care.    [] Other:        Frequency: 3 x/weeks for 20 visits    Time In: 1000 Time Out: 1055    Electronically signed by Dulce August PTA on 8/10/2018 at 10:08 AM

## 2018-08-13 ENCOUNTER — HOSPITAL ENCOUNTER (OUTPATIENT)
Dept: OCCUPATIONAL THERAPY | Age: 43
Setting detail: THERAPIES SERIES
Discharge: HOME | End: 2018-08-13
Payer: COMMERCIAL

## 2018-08-13 PROCEDURE — 97110 THERAPEUTIC EXERCISES: CPT

## 2018-08-16 ENCOUNTER — HOSPITAL ENCOUNTER (OUTPATIENT)
Dept: PHYSICAL THERAPY | Age: 43
Setting detail: THERAPIES SERIES
Discharge: HOME | End: 2018-08-16
Payer: COMMERCIAL

## 2018-08-16 ENCOUNTER — HOSPITAL ENCOUNTER (OUTPATIENT)
Dept: OCCUPATIONAL THERAPY | Age: 43
Setting detail: THERAPIES SERIES
Discharge: HOME | End: 2018-08-16
Payer: COMMERCIAL

## 2018-08-16 PROCEDURE — 97140 MANUAL THERAPY 1/> REGIONS: CPT

## 2018-08-16 PROCEDURE — 97110 THERAPEUTIC EXERCISES: CPT

## 2018-08-16 PROCEDURE — 97112 NEUROMUSCULAR REEDUCATION: CPT

## 2018-08-16 NOTE — FLOWSHEET NOTE
slider, Flexion, abd  CW, CCW-8/16 painful   Wrist maze 2x through  x Pt compensates for lack of R wrist motion with shoulder flex/abduction and trunk lean-partly improves w/cues   Ball sqeeze 10 x 3\" yellow x Difficulty holding ball   Power Bar U's, N's x10 red x Bending in each direction   Power Bar Twist  5x red x Painful in hand   Bicep Curls 15x AROM x Add weight next Rx   Wrist Ext 10x AROM x Performed after 5 reps PROM wrist ext   Other: Cont ex per above log, large amount assistance with wall slides and  on bar, UBE handles. Initiated AROM bicep curls and wrist ext. Pt requires large amount cues and assistance to perform ex correctly. Neuro: NDT UE training in seated. Lateral and retro weight shifting on RUE to increase proprioceptive input with increased weight bearing. Felicita provided to maintain elbow extension and open palm. Specific Instructions for next treatment: Progress ROM and strengthening exercises, progress elbow ext exercises and wrist ext exercises. Treatment Charges: Mins Units   []  Modalities     [x]  Ther Exercise 47 3   []  Manual Therapy     []  Ther Activities     []  Aquatics     []  Vasocompression     [x]  Other: Neuro 11 1   Total Treatment time 58 4       Assessment: [x] Progressing toward goals. [] No change. [] Other:    STG: (to be met in 10 treatments)  1. ? Pain: Pt to report decrease in pn in RUE to be 8/10 or less on average  2. ? ROM: Pt to increase ROM of R Shoulder flex 120, abd 90, ER 35, IR 80 ELBOW ext 5, WRIST flex 75 ext 35, RD 18, UD 15, Forearm pronation/supination 45   3. ? Strength: Pt to increase strength with shoulder flex/abd/er 4-/5, IR 4/5, elbow flex 4+/5, ext 4/5, forearm sup 4/5 pro 4-/5 wrist flex 3+/5 ext 4-/5 RD 3+/5 UD 4-/5 EPL 3/5 finger abd 3+/5,  5lb, pinch/lateral pinch 1lb  4. ? Function: Pt to report increased use of B UE with functional tasks at home such as cooking  5. Independent with Home Exercise Programs  6.  Pt to demonstrate and verbalize correct posture  LTG: (to be met in 20 treatments)  1. Pt to report decreased in pn in RUE to be 6/10 or less on average  2. Pt to increase ROM of R shoulder flex 135, abd 110, ER 50 Wrist ext 45 UD 25, Forearm pronation/supination 60  3. Pt to increase strength with shoulder flex/abd/er 4/5, IR 4+/5, elbow flex 5/5, ext 4+/5, forearm sup 4+/5 pro 4+/5 wrist flex 4-/5 ext 4/5 RD 4-/5 UD 4/5 EPL 3+/5 finger abd 4-/5,  10lb, pinch/lateral pinch 5lb   4. Pt to report decreased difficulty with reaching and grabbing an object. G-CODE     Functional Limitation: Carrying, Moving, and Handling objects  Functional Assessment Used: Therapist Discretion and Pt report  Current Status Modifier: CL  Goal Status Modifier: CK  Discharge Status Modifier:       Pt. Education:  [x] Yes  [] No  [x] Reviewed Prior HEP/Ed  Method of Education: [x] Verbal  [] Demo  [] Written  Comprehension of Education: Educated Pt that she could use her exercise balls to squeeze at home to work on  strength and use a towel or pillow case for wall slides. [x] Verbalizes understanding. [] Demonstrates understanding. [x] Needs review. [] Demonstrates/verbalizes HEP/Ed previously given. Plan: [x] Continue per plan of care.    [] Other:        Frequency: 3 x/weeks for 20 visits    Time In: 1300   Time Out: 7020    Electronically signed by Zenaida Longo PT on 8/16/2018 at 1:00 PM

## 2018-08-21 ENCOUNTER — HOSPITAL ENCOUNTER (OUTPATIENT)
Dept: PHYSICAL THERAPY | Age: 43
Setting detail: THERAPIES SERIES
Discharge: HOME | End: 2018-08-21
Payer: COMMERCIAL

## 2018-08-21 ENCOUNTER — HOSPITAL ENCOUNTER (OUTPATIENT)
Dept: OCCUPATIONAL THERAPY | Age: 43
Setting detail: THERAPIES SERIES
Discharge: HOME | End: 2018-08-21
Payer: COMMERCIAL

## 2018-08-21 PROCEDURE — 97112 NEUROMUSCULAR REEDUCATION: CPT

## 2018-08-21 PROCEDURE — 97110 THERAPEUTIC EXERCISES: CPT

## 2018-08-21 NOTE — FLOWSHEET NOTE
[x] [facility]       Occupational Therapy             1st floor       [address], [place]         Phone: [phone]       Fax: [phone] [] [address] [facility] at            [address] [facility] , [address] [place] [address]     Phone: [phone]     Fax: [phone]      Occupational Therapy Daily Treatment Note    Date:  2018  Patient Name:  Edgardo Nova    :  1975  MRN: [id number]  Physician: [address] [facility]: [employer] [facility] [phone]---QJ/LG/OM No precert required for initial eval and first 30 visits. For re eval and additional visits, precert required, phone [phone]. Calendar year. Not combined                Medical Diagnosis: Z86.73 S/P CVA  Rehab Codes: FM Skills Loss R29.818, Lack of coordination R27.8    Onset Date: 10-                      Next Dr. Dotty Fothergill:  Visit# / Total Visits:   Cancels/No Shows: 0/0    Subjective:    Pain:  [] Yes  [x] No Location: Entire RUE Pain Rating: (0-10 scale) 6/10  Pain altered Tx:  [x] No  [] Yes  Action: NA  Pt Comments:    Objective:  Modalities: NA    Exercises:                                        Flow Sheet:  Exercise Reps/Time Weight/Level Comments   Cones, active pronation/supination 10 reps   Completed. Grasp and release foam cubes 1/2 series   Completed.    Passive self- ranging of right elbow, wrist,  fingers/thumb 10 reps   Completed.     Bilat UE reach with rolling pin  20 reps   Increased reps   Wooden pegs without velcro 15 reps   Completed.         Comments/Assessment: Very mild flexor tone palpated at Rt elbow and wrist, mild flexor tone noted in fingers. Passive self-ranging of RUE continued to maintain full joint excursion. Pt demo'd appropriately. \"Claw hand\" posturing observed with grasp and release activities when pt extends digits. All RUE motion is slow, deliberate, but with fair placement and control; hand control deficit is more pronounced.       Specific Instructions for Next Treatment:    Treatment Charges: Mins Units   []  Modalities     [x]  Ther Exercise  50 3   []  Manual Therapy     []  Ther Activities     []  Orthotic fitting/training     []  Orthotic re-check     []  Other         Assessment: [x] Progressing toward goals. [] No change. [] Other:    Short Term Goals: (  9    Treatments)  1. Decrease Pain: to 6/10 with simple ADLS  2. Increase A/P ROM (degrees): of R Shoulder to 100 for increased I with ADLs  3. Increase strength (pounds): increase R  strength to 7 for increased I with [address] tasks  4. Increase function:  DASH score will be 80% functionally impaired or less  5. Independent with Home Exercise Program in 3 sessions        Long Term Goals: (  18  Treatments)  1. Pt to increase R UE ROM         1. All MCPs to 90 flexion  2. All DIPs to 90 flexion  3. All PIPs to 75 degrees flexion  4. Decrease extensor lag of all DIPs and PIPs to -5 or less  2. Increase R  strength to 15 for increased I with [address] tasks        Patient Goals: To get my hand back    Pt. Education:  [x] Yes  [] No  [x] Reviewed Prior HEP/Ed  Method of Education: [x] Verbal  [x] Demo  [] Written  Re:  Comprehension of Education:  [] Verbalizes understanding. [x] Demonstrates understanding. [] Needs review. [x] Demonstrates/verbalizes HEP/Ed previously given. Treatment Plan: 2 times a week for 18 visits to improve gross/fine motor coordination of RUE. Time In/Out: 1411 - 1501  Total Treatment Time:  48  Min.       Electronically signed by: Steffanie Roman OTR/L, CHT

## 2018-08-21 NOTE — FLOWSHEET NOTE
[x] [facility]       Outpatient Physical        Therapy       [address]       Phone: [phone]       Fax: [phone] [] [facility] at [address] [facility]       Phone: [phone]       Fax: [phone] [] [facility]  [address] [facility]   Phone: [phone]   Fax:  [phone]     Physical Therapy Daily Treatment Note    Date:  2018  Patient Name:  Mayda Mendieta    :  1975  MRN: [id number]  Physician: Alyssa Soria MD, Jesse Briones DMICHAEL. Insurance: Medicare  Medical Diagnosis: CVA, unspecified mechanism I63.9                 Rehab Codes: M62.81, M25.611, M25.621, M25.631, M25.641, M79.602, R29.3  Onset date: 10/1/2016                       Next 's appt. :   Visit# / total visits:   Cancels/No Shows: 0/0    Subjective:  Says she is always in pain  Pain:  [x] Yes  [] No           Location: entire R UE  Pain Rating: (0-10 scale) 6/10 currently   Pain altered Tx:  [x] No  [] Yes  Action:  Comments:  Pt arrived 40 minutes late for PT treatment and had an OT appt 30 minutes later.     Objective:  Modalities:    Precautions:  Exercises:  Exercise Reps/ Time Weight/ Level Comments   Scifit   L1 Therapist assisted w/R  on handle         Supine with cane      Chest press 15x 2lb    protraction 10x 2lb verbal and tactile cues to keep elbow extended   Shoulder Flex 15x 2 lb    Side lying   With shoulder stabilized   Scapular retraction 10 AAROM Tactile cueing at scapula   Shoulder ER 10 AAROM    Shoulder abduction 10 AAROM    Standing      Wall slides   Flexion, abduction-8/16 AAROM   Ball roll on wall   Yellow   Flexion, 8/16 unable   Finger ladder   8 levels  Difficulty with finger movement, therapist assisted   Wand exercises      -Flex 10x 2lb    -abd 10x 2lb    -ER/IR  10x 2lb Cues to keeps elbows at sides           Bicep curl  15x 2 lb Cuff wt around wrist   Tricep extension 15 2 lb Cuff wt-elbow supported   Seated      Mat towel slides 15x ea AAROM  Flexion, abd  CW, CCW-needs shoulder stabilized   Wrist maze   Pt compensates for lack of R wrist motion with shoulder flex/abduction and trunk lean-partly improves w/cues   Ball sqeeze  yellow Difficulty holding ball   Power Bar U's, N's  red Bending in each direction   Power Bar Twist   red Painful in hand   Bicep Curls  AROM Add weight next Rx   Wrist Ext  AROM    Other:       Neuro: NDT UE training in seated. Lateral and retro weight shifting on RUE to increase proprioceptive input with increased weight bearing. Felicita provided to maintain elbow extension and open palm. PNF D1 and D2 flexion and extension AAROM    Specific Instructions for next treatment: Progress ROM and strengthening exercises, progress elbow ext exercises and wrist ext exercises. Treatment Charges: Mins Units   []  Modalities     [x]  Ther Exercise 20 1   []  Manual Therapy     []  Ther Activities     []  Aquatics     []  Vasocompression     [x]  Other: Neuro 10 1   Total Treatment time 30        Assessment: [x] Progressing toward goals. [] No change. [x] Other:Short session due to pt late arrival.   Pt has difficulty gripping Therabar for exercises. Added elbow exercises with cuff weight around wrist and added side lying shoulder exercises and PNF to improve strength of shoulder and elbow. Needs shoulder/trunk stabilized during towel slide exercises to avoid compensating with trunk movements.   STG: (to be met in 10 treatments)  1. ? Pain: Pt to report decrease in pn in RUE to be 8/10 or less on average  2. ? ROM: Pt to increase ROM of R Shoulder flex 120, abd 90, ER 35, IR 80 ELBOW ext 5, WRIST flex 75 ext 35, RD 18, UD 15, Forearm pronation/supination 45   3. ? Strength: Pt to increase strength with shoulder flex/abd/er 4-/5, IR 4/5, elbow flex 4+/5, ext 4/5, forearm sup 4/5 pro 4-/5 wrist flex 3+/5 ext 4-/5 RD 3+/5 UD 4-/5 EPL 3/5 finger abd 3+/5,  5lb, pinch/lateral pinch 1lb  4. ? Function: Pt to report increased use of B UE with functional tasks at home such as cooking  5. Independent with Home Exercise Programs  6. Pt to demonstrate and verbalize correct posture  LTG: (to be met in 20 treatments)  1. Pt to report decreased in pn in RUE to be 6/10 or less on average  2. Pt to increase ROM of R shoulder flex 135, abd 110, ER 50 Wrist ext 45 UD 25, Forearm pronation/supination 60  3. Pt to increase strength with shoulder flex/abd/er 4/5, IR 4+/5, elbow flex 5/5, ext 4+/5, forearm sup 4+/5 pro 4+/5 wrist flex 4-/5 ext 4/5 RD 4-/5 UD 4/5 EPL 3+/5 finger abd 4-/5,  10lb, pinch/lateral pinch 5lb   4. Pt to report decreased difficulty with reaching and grabbing an object. G-CODE     Functional Limitation: Carrying, Moving, and Handling objects  Functional Assessment Used: Therapist Discretion and Pt report  Current Status Modifier: CL  Goal Status Modifier: CK  Discharge Status Modifier:       Pt. Education:  [x] Yes  [] No  [x] Reviewed Prior HEP/Ed  Method of Education: [x] Verbal  [] Demo  [] Written  Comprehension of Education: . [x] Verbalizes understanding-new exercises. [] Demonstrates understanding. [x] Needs review. [] Demonstrates/verbalizes HEP/Ed previously given. Plan: [x] Continue per plan of care.    [] Other:        Frequency: 3 x/weeks for 20 visits    Time In: 4196   Time Out: 1400    Electronically signed by Eric Peralta PTA on 8/21/2018 at 1:25 PM

## 2018-08-23 ENCOUNTER — HOSPITAL ENCOUNTER (OUTPATIENT)
Dept: OCCUPATIONAL THERAPY | Age: 43
Setting detail: THERAPIES SERIES
Discharge: HOME | End: 2018-08-23
Payer: COMMERCIAL

## 2018-08-23 ENCOUNTER — HOSPITAL ENCOUNTER (OUTPATIENT)
Dept: PHYSICAL THERAPY | Age: 43
Setting detail: THERAPIES SERIES
Discharge: HOME | End: 2018-08-23
Payer: COMMERCIAL

## 2018-08-23 PROCEDURE — 97110 THERAPEUTIC EXERCISES: CPT

## 2018-08-23 PROCEDURE — 97112 NEUROMUSCULAR REEDUCATION: CPT

## 2018-08-23 NOTE — FLOWSHEET NOTE
demonstrate and verbalize correct posture  LTG: (to be met in 20 treatments)  1. Pt to report decreased in pn in RUE to be 6/10 or less on average  2. Pt to increase ROM of R shoulder flex 135, abd 110, ER 50 Wrist ext 45 UD 25, Forearm pronation/supination 60  3. Pt to increase strength with shoulder flex/abd/er 4/5, IR 4+/5, elbow flex 5/5, ext 4+/5, forearm sup 4+/5 pro 4+/5 wrist flex 4-/5 ext 4/5 RD 4-/5 UD 4/5 EPL 3+/5 finger abd 4-/5,  10lb, pinch/lateral pinch 5lb   4. Pt to report decreased difficulty with reaching and grabbing an object. G-CODE     Functional Limitation: Carrying, Moving, and Handling objects  Functional Assessment Used: Therapist Discretion and Pt report  Current Status Modifier: CL  Goal Status Modifier: CK  Discharge Status Modifier:       Pt. Education:  [x] Yes  [] No  [x] Reviewed Prior HEP/Ed  Method of Education: [x] Verbal  [] Demo  [] Written  Comprehension of Education: . [x] Verbalizes understanding-new exercises. [] Demonstrates understanding. [x] Needs review. [] Demonstrates/verbalizes HEP/Ed previously given. Plan: [x] Continue per plan of care.    [] Other:        Frequency: 3 x/weeks for 20 visits    Time In: 0505   Time Out: 1510    Electronically signed by Adriana Daniel PT on 8/23/2018 at 2:10 PM

## 2018-08-23 NOTE — FLOWSHEET NOTE
[x] [facility]       Occupational Therapy             1st floor       [address] [place], [place]         Phone: [phone]       Fax: [phone] [] [address] [facility] at            [address] [facility] , [address] [facility]     Phone: [phone]     Fax: [phone]      Occupational Therapy Daily Treatment Note    Date:  2018  Patient Name:  Esha Juarez    :  1975  MRN: [id number]  Physician: [address] [facility]: Dominic Peñaloza [phone]---UJ/DJ/EF No precert required for initial eval and first 30 visits. For re eval and additional visits, precert required, phone [phone]. Calendar year. Not combined                Medical Diagnosis: Z86.73 S/P CVA  Rehab Codes: FM Skills Loss R29.818, Lack of coordination R27.8    Onset Date: 10-                      Next Dr. Tamar Agarwal:  Visit# / Total Visits:   Cancels/No Shows: 0/0    Subjective:    Pain:  [] Yes  [x] No Location: Entire RUE Pain Rating: (0-10 scale) 6/10  Pain altered Tx:  [x] No  [] Yes  Action: NA  Pt Comments:    Objective:  Modalities: NA    Exercises:                                        Flow Sheet:  Exercise Reps/Time Weight/Level Comments   Cones, active pronation/supination 10 reps   Not Completed. Grasp and release foam cubes 1/2 series   Completed.    Passive self- ranging of right elbow, wrist,  fingers/thumb 10 reps   Completed.     Bilat UE reach with rolling pin  20 reps   Not Increased reps   Wooden pegs without velcro 15 reps   Completed.         Comments/Assessment: Very mild flexor tone palpated at Rt elbow and wrist, mild flexor tone noted in fingers. Passive self-ranging of RUE continued to maintain full joint excursion. Pt demo'd appropriately. \"Claw hand\" posturing observed with grasp and release activities when pt extends digits. All RUE motion is slow, deliberate, but with fair placement and control; hand control deficit is more pronounced.       Specific Instructions for Next Treatment:    Treatment Charges: Mins Units   []  Modalities     [x]  Ther Exercise  60 (co tx) 2   []  Manual Therapy     []  Ther Activities     []  Orthotic fitting/training     []  Orthotic re-check     []  Other         Assessment: [x] Progressing toward goals. [] No change. [] Other:    Short Term Goals: (  9    Treatments)  1. Decrease Pain: to 6/10 with simple ADLS  2. Increase A/P ROM (degrees): of R Shoulder to 100 for increased I with ADLs  3. Increase strength (pounds): increase R  strength to 7 for increased I with [address] tasks  4. Increase function:  DASH score will be 80% functionally impaired or less  5. Independent with Home Exercise Program in 3 sessions        Long Term Goals: (  18  Treatments)  1. Pt to increase R UE ROM         1. All MCPs to 90 flexion  2. All DIPs to 90 flexion  3. All PIPs to 75 degrees flexion  4. Decrease extensor lag of all DIPs and PIPs to -5 or less  2. Increase R  strength to 15 for increased I with [address] tasks        Patient Goals: To get my hand back    Pt. Education:  [x] Yes  [] No  [x] Reviewed Prior HEP/Ed  Method of Education: [x] Verbal  [x] Demo  [] Written  Re:  Comprehension of Education:  [] Verbalizes understanding. [x] Demonstrates understanding. [] Needs review. [x] Demonstrates/verbalizes HEP/Ed previously given. Treatment Plan: 2 times a week for 18 visits to improve gross/fine motor coordination of RUE. Time In/Out: 6341-1769  Total Treatment Time:  61  Min.       Electronically signed by: Daisy PIÑA/DESIRAE WILHELM

## 2018-08-28 ENCOUNTER — HOSPITAL ENCOUNTER (OUTPATIENT)
Dept: PHYSICAL THERAPY | Age: 43
Setting detail: THERAPIES SERIES
Discharge: HOME | End: 2018-08-28
Payer: COMMERCIAL

## 2018-08-28 ENCOUNTER — HOSPITAL ENCOUNTER (OUTPATIENT)
Dept: OCCUPATIONAL THERAPY | Age: 43
Setting detail: THERAPIES SERIES
Discharge: HOME | End: 2018-08-28
Payer: COMMERCIAL

## 2018-08-28 PROCEDURE — 97110 THERAPEUTIC EXERCISES: CPT

## 2018-08-28 PROCEDURE — 97112 NEUROMUSCULAR REEDUCATION: CPT

## 2018-08-28 NOTE — FLOWSHEET NOTE
[x] [facility]       Outpatient Physical Therapy       [address]       Phone: [phone]       Fax: [phone]       Physical Therapy Daily Treatment Note    Date:  2018  Patient Name:  Naty Christy    :  1975  MRN: [id number]  Physician: Stephanie Jurado MD, Obi Menezes D.O. Insurance: Medicare  Medical Diagnosis: CVA, unspecified mechanism I63.9                 Rehab Codes: M62.81, M25.611, M25.621, M25.631, M25.641, M79.602, R29.3  Onset date: 10/1/2016                       Next 's appt. :   Visit# / total visits:   Cancels/No Shows: 0/0    Subjective:  Says she is always in pain  Pain:  [x] Yes  [] No           Location: entire R UE  Pain Rating: (0-10 scale) 6/10   Pain altered Tx:  [x] No  [] Yes  Action:  Comments:       Objective:  Modalities:    Precautions:  Exercises:  Exercise Reps/ Time Weight/ Level Comments   NuStep 8 min Level 2 Used attachment to maintain  on handle         Supine with cane      Chest press 15x 2 lb 1 lb cane & 1lb cuff weight   protraction 15x 2 lb   \"   Shoulder Flex 15x 2 lb   \"   Shoulder IR at 90 °/90 °  10 AAROM 1 lb cuff weight /no cane   Side lying   With shoulder stabilized   Scapular retraction 15 AAROM Tactile cueing 1 lb cuff weight   Shoulder ER 15 AAROM   \"   Shoulder abduction 10x AAROM   \"   Retro shoulder rolls 15 AAROM   \"   Standing      Wall slides   Flexion, abduction AAROM   Ball roll on wall   Yellow   Flexion   Finger ladder   8 levels  Difficulty with finger movement, therapist assisted   Wand exercises      -Flex  2lb    -abd  2lb    -ER/IR   2lb Cues to keeps elbows at sides           Bicep curl  15 2 lb Cuff wt around wrist   Tricep extension 10  10 2 lb  1 lb Cuff wt-elbow supported   Seated      Mat towel slides 15x ea 1 lb  Flexion, abd  CW, CCW-with cuff wt around wrist   Wrist maze   Pt compensates for lack of R wrist motion with shoulder flex/abduction and trunk

## 2018-08-28 NOTE — FLOWSHEET NOTE
[x] [facility]       Occupational Therapy             1st floor       [address] [place], [place]         Phone: [phone]       Fax: [phone] [] [address] [facility] at            [address] [facility] , [address] [facility]     Phone: [phone]     Fax: [phone]      Occupational Therapy Daily Treatment Note    Date:  2018  Patient Name:  Tiffanie Bateman    :  1975  MRN: [id number]  Physician: [address] [facility] [place]: Victorino Anaya [phone]---HG/GA/[place] No precert required for initial eval and first 30 visits. For re eval and additional visits, precert required, phone [phone]. Calendar year. Not combined                Medical Diagnosis: Z86.73 S/P CVA  Rehab Codes: FM Skills Loss R29.818, Lack of coordination R27.8    Onset Date: 10-                      Next Dr. Lorena Nguyen:  Visit# / Total Visits:   Cancels/No Shows: 0/0    Subjective:    Pain:  [] Yes  [x] No Location: Entire RUE Pain Rating: (0-10 scale) 0/10  Pain altered Tx:  [x] No  [] Yes  Action: NA  Pt Comments:    Objective:  Modalities: NA    Exercises:                                        Flow Sheet:  Exercise Reps/Time Weight/Level Comments   Cones, active pronation/supination 8 reps    Completed. Grasp and release foam cubes 1/2 series   Completed.    Passive self- ranging of right elbow, wrist,  fingers/thumb 10 reps   Completed.     Bilat UE reach with rolling pin  25 reps   Completed   Wooden pegs without velcro 20 reps   Increased reps         Comments/Assessment: Very mild flexor tone palpated at Rt elbow and wrist, mild flexor tone noted in fingers. Passive self-ranging of RUE continued to maintain full joint excursion. Pt demo'd appropriately. \"Claw hand\" posturing observed with grasp and release activities when pt extends digits. All RUE motion is slow, deliberate, but with fair placement and control; hand control deficit is more pronounced.       Specific Instructions for Next Treatment:    Treatment Charges: Mins Units   []  Modalities     [x]  Ther Exercise  65   4   []  Manual Therapy     []  Ther Activities     []  Orthotic fitting/training     []  Orthotic re-check     []  Other         Assessment: [x] Progressing toward goals. [] No change. [] Other:    Short Term Goals: (  9    Treatments)  1. Decrease Pain: to 6/10 with simple ADLS  2. Increase A/P ROM (degrees): of R Shoulder to 100 for increased I with ADLs  3. Increase strength (pounds): increase R  strength to 7 for increased I with [address] tasks  4. Increase function:  DASH score will be 80% functionally impaired or less  5. Independent with Home Exercise Program in 3 sessions        Long Term Goals: (  18  Treatments)  1. Pt to increase R UE ROM         1. All MCPs to 90 flexion  2. All DIPs to 90 flexion  3. All PIPs to 75 degrees flexion  4. Decrease extensor lag of all DIPs and PIPs to -5 or less  2. Increase R  strength to 15 for increased I with [address] tasks        Patient Goals: To get my hand back    Pt. Education:  [x] Yes  [] No  [x] Reviewed Prior HEP/Ed  Method of Education: [x] Verbal  [x] Demo  [] Written  Re:  Comprehension of Education:  [] Verbalizes understanding. [x] Demonstrates understanding. [] Needs review. [x] Demonstrates/verbalizes HEP/Ed previously given. Treatment Plan: 2 times a week for 18 visits to improve gross/fine motor coordination of RUE. Time In/Out: 1350 - 1455  Total Treatment Time:  72  Min.       Electronically signed by: AYANA Lopez/CHOCO, CHT

## 2018-08-30 ENCOUNTER — HOSPITAL ENCOUNTER (OUTPATIENT)
Dept: OCCUPATIONAL THERAPY | Age: 43
Setting detail: THERAPIES SERIES
Discharge: HOME | End: 2018-08-30
Payer: COMMERCIAL

## 2018-08-30 ENCOUNTER — HOSPITAL ENCOUNTER (OUTPATIENT)
Dept: PHYSICAL THERAPY | Age: 43
Setting detail: THERAPIES SERIES
Discharge: HOME | End: 2018-08-30
Payer: COMMERCIAL

## 2018-08-30 PROCEDURE — 97112 NEUROMUSCULAR REEDUCATION: CPT

## 2018-08-30 PROCEDURE — 97110 THERAPEUTIC EXERCISES: CPT

## 2018-08-30 NOTE — FLOWSHEET NOTE
[x] [facility]       Outpatient Physical Therapy       [address]       Phone: [phone]       Fax: [phone]       Physical Therapy Daily Treatment Note    Date:  2018  Patient Name:  Afia Phillips    :  1975  MRN: [id number]  Physician: Tej Lewis MD, Jason Oh, D.O. Insurance: Medicare  Medical Diagnosis: CVA, unspecified mechanism I63.9                 Rehab Codes: M62.81, M25.611, M25.621, M25.631, M25.641, M79.602, R29.3  Onset date: 10/1/2016                       Next 's appt. :   Visit# / total visits: 10/20  Cancels/No Shows: 0/0    Subjective:  Says her shoulder is sore today and was sore after last treatment  Pain:  [x] Yes  [] No           Location: entire R UE  Pain Rating: (0-10 scale) 6/10   Pain altered Tx:  [x] No  [] Yes  Action:  Comments:       Objective:  Modalities:    Precautions:  Exercises:  Exercise Reps/ Time Weight/ Level Comments   NuStep 10 min Level 2 Used attachment to maintain  on handle         Supine with cane      Chest press 15x 2 lb 1 lb cane & 1lb cuff weight   Protraction 15x 2 lb   \"   Shoulder Flex 15x 2 lb   \"   Shoulder IR at 90 °/90 °  10 AAROM 1 lb cuff weight /no cane   Side lying   With shoulder stabilized   Scapular retraction 15 AAROM    Shoulder ER 15 AAROM    Shoulder abduction 10x AAROM    Retro shoulder rolls 15 AAROM    Standing      Wall slides   Flexion, abduction AAROM   Ball roll on wall   Yellow   Flexion   Wand exercises      -Flex  2lb    -abd  2lb    -ER/IR   2lb Cues to keeps elbows at sides           Bicep curl  15 2 lb Cuff wt around wrist   Tricep extension 15 1 lb Cuff wt-elbow supported   Seated      Mat towel slides  15x ea 1 lb  Flexion, abd  CW, CCW-with cuff wt around wrist   Wrist maze   Pt compensates for lack of R wrist motion with shoulder flex/abduction and trunk lean   Other:       Neuro: NDT UE training in seated.   Lateral and retro weight shifting on RUE to increase proprioceptive input with increased weight bearing. Felicita provided to maintain elbow extension and open palm. PNF D1 and D2 flexion and extension AAROM    Specific Instructions for next treatment:     Treatment Charges: Mins Units   []  Modalities     [x]  Ther Exercise 36 2   []  Manual Therapy     []  Ther Activities     []  Aquatics     []  Vasocompression     [x]  Other: Neuro  8 1   Total Treatment time 44        Assessment: [x] Progressing toward goals-    [] No change. [x] Other:  Arm fatigued with exercises, more difficulty with grasp on towel roll today      STG: (to be met in 10 treatments)  1. ? Pain: Pt to report decrease in pn in RUE to be 8/10 or less on average  2. ? ROM: Pt to increase ROM of R Shoulder flex 120, abd 90, ER 35, IR 80 ELBOW ext 5, WRIST flex 75 ext 35, RD 18, UD 15, Forearm pronation/supination 45   3. ? Strength: Pt to increase strength with shoulder flex/abd/er 4-/5, IR 4/5, elbow flex 4+/5, ext 4/5, forearm sup 4/5 pro 4-/5 wrist flex 3+/5 ext 4-/5 RD 3+/5 UD 4-/5 EPL 3/5 finger abd 3+/5,  5lb, pinch/lateral pinch 1lb  4. ? Function: Pt to report increased use of B UE with functional tasks at home such as cooking  5. Independent with Home Exercise Programs  6. Pt to demonstrate and verbalize correct posture  LTG: (to be met in 20 treatments)  1. Pt to report decreased in pn in RUE to be 6/10 or less on average  2. Pt to increase ROM of R shoulder flex 135, abd 110, ER 50 Wrist ext 45 UD 25, Forearm pronation/supination 60  3. Pt to increase strength with shoulder flex/abd/er 4/5, IR 4+/5, elbow flex 5/5, ext 4+/5, forearm sup 4+/5 pro 4+/5 wrist flex 4-/5 ext 4/5 RD 4-/5 UD 4/5 EPL 3+/5 finger abd 4-/5,  10lb, pinch/lateral pinch 5lb   4. Pt to report decreased difficulty with reaching and grabbing an object. G-CODE     Functional Limitation: Carrying, Moving, and Handling objects  Functional Assessment Used:  Therapist Discretion and Pt report  Current Status Modifier: CL  Goal Status Modifier: CK  Discharge Status Modifier:       Pt. Education:  [] Yes  [] No  [x] Reviewed Prior HEP/Ed  Method of Education: [x] Verbal  [] Demo  [] Written  Comprehension of Education: .   [] Verbalizes understanding  [] Demonstrates understanding. [] Needs review. [x] Demonstrates/verbalizes HEP/Ed previously given. Plan: [x] Continue per plan of care.    [x] Other:needs recheck and G code       Time In: 4465   Time Out: 0066    Electronically signed by Rehan Reese PTA on 8/30/2018 at 12:52 PM

## 2018-08-30 NOTE — FLOWSHEET NOTE
Instructions for Next Treatment:    Treatment Charges: Mins Units   []  Modalities     [x]  Ther Exercise  55  (co tx) 2   []  Manual Therapy     []  Ther Activities     []  Orthotic fitting/training     []  Orthotic re-check     []  Other         Assessment: [x] Progressing toward goals. [] No change. [] Other:    Short Term Goals: (  9    Treatments)  1. Decrease Pain: to 6/10 with simple ADLS  2. Increase A/P ROM (degrees): of R Shoulder to 100 for increased I with ADLs  3. Increase strength (pounds): increase R  strength to 7 for increased I with [address] tasks  4. Increase function:  DASH score will be 80% functionally impaired or less  5. Independent with Home Exercise Program in 3 sessions        Long Term Goals: (  18  Treatments)  1. Pt to increase R UE ROM         1. All MCPs to 90 flexion  2. All DIPs to 90 flexion  3. All PIPs to 75 degrees flexion  4. Decrease extensor lag of all DIPs and PIPs to -5 or less  2. Increase R  strength to 15 for increased I with [address] tasks        Patient Goals: To get my hand back    Pt. Education:  [x] Yes  [] No  [x] Reviewed Prior HEP/Ed  Method of Education: [x] Verbal  [x] Demo  [] Written  Re:  Comprehension of Education:  [] Verbalizes understanding. [x] Demonstrates understanding. [] Needs review. [x] Demonstrates/verbalizes HEP/Ed previously given. Treatment Plan: 2 times a week for 18 visits to improve gross/fine motor coordination of RUE. Time In/Out: 1400- 1455  Total Treatment Time:  54  Min.       Electronically signed by: Dean PIÑA/DESIRAE WILHELM

## 2018-08-31 ENCOUNTER — APPOINTMENT (OUTPATIENT)
Dept: OCCUPATIONAL THERAPY | Age: 43
End: 2018-08-31
Payer: COMMERCIAL

## 2018-09-07 ENCOUNTER — APPOINTMENT (OUTPATIENT)
Dept: OCCUPATIONAL THERAPY | Age: 43
End: 2018-09-07
Payer: COMMERCIAL

## 2018-09-13 ENCOUNTER — HOSPITAL ENCOUNTER (OUTPATIENT)
Dept: PHYSICAL THERAPY | Age: 43
Setting detail: THERAPIES SERIES
Discharge: HOME | End: 2018-09-13
Payer: COMMERCIAL

## 2018-09-13 PROCEDURE — 97110 THERAPEUTIC EXERCISES: CPT

## 2018-09-13 PROCEDURE — G8985 CARRY GOAL STATUS: HCPCS

## 2018-09-13 PROCEDURE — G8984 CARRY CURRENT STATUS: HCPCS

## 2018-09-13 NOTE — FLOWSHEET NOTE
[x] [facility]       Outpatient Physical Therapy       [address]       Phone: [phone]       Fax: [phone]       Physical Therapy Daily Treatment Note    Date:  2018  Patient Name:  Nathalie Vazquez    :  1975  MRN: [id number]  Physician: Ashley Yates MD, Pavan Pastor D.O. Insurance: Medicare  Medical Diagnosis: CVA, unspecified mechanism I63.9                 Rehab Codes: M62.81, M25.611, M25.621, M25.631, M25.641, M79.602, R29.3  Onset date: 10/1/2016                       Next 's appt. :   Visit# / total visits:   Cancels/No Shows: 0/2    Subjective:    Pain:  [x] Yes  [] No           Location: entire R UE  Pain Rating: (0-10 scale) 5/10   Pain altered Tx:  [x] No  [] Yes  Action:  Comments:  Pt report R UE pain 8/10 at worst.   Pt reports able to use arm \"somewhat\" more at home. Pt unsure if reaching or grabbing has improved. Pt reports she was really sick last week, that is why she missed her appts.       Objective:   Modalities:    Precautions:  Exercises:  Exercise Reps/ Time Weight/ Level Comments   NuStep 7 min Level 2 Used soft velcro wrap to maintain  on handle         Supine with cane      Chest press 15x  2 lb 2 lb cane & 1lb cuff weight, progressed cane weight    Protraction 15x  2 lb   \"   Shoulder Flex 15x  2 lb   \"   Shoulder IR at 90 °/90 °  15x  AAROM 1 lb cuff weight /no cane-progressed reps    Side lying   With shoulder stabilized   Scapular retraction 15x  AAROM    Shoulder ER 15x  AAROM    Shoulder abduction 15x  AAROM    Retro shoulder rolls  AAROM    Standing      Wall slides   Flexion, abduction AAROM   Ball roll on wall   Yellow   Flexion   Wand exercises      -Flex  2lb    -abd  2lb    -ER/IR   2lb Cues to keeps elbows at sides           Bicep curl  -- 2 lb Cuff wt around wrist   Tricep extension 15x  1 lb Cuff wt-elbow supported-performed in supine    Seated      Mat towel slides  -- 1

## 2018-09-18 ENCOUNTER — HOSPITAL ENCOUNTER (OUTPATIENT)
Dept: PHYSICAL THERAPY | Age: 43
Setting detail: THERAPIES SERIES
Discharge: HOME | End: 2018-09-18
Payer: COMMERCIAL

## 2018-09-18 ENCOUNTER — HOSPITAL ENCOUNTER (OUTPATIENT)
Dept: OCCUPATIONAL THERAPY | Age: 43
Setting detail: THERAPIES SERIES
Discharge: HOME | End: 2018-09-18
Payer: COMMERCIAL

## 2018-09-18 ENCOUNTER — HOSPITAL ENCOUNTER (OUTPATIENT)
Dept: SPEECH THERAPY | Age: 43
Setting detail: THERAPIES SERIES
Discharge: HOME | End: 2018-09-18
Payer: COMMERCIAL

## 2018-09-18 PROCEDURE — 97110 THERAPEUTIC EXERCISES: CPT

## 2018-09-18 NOTE — PRE-CERTIFICATION NOTE
Medicare Cap   [x] Physical Therapy  [] Speech Therapy  [] Occupational therapy  *PT and Speech caps combine      $2010 Cap limit < kx modifier needed < $5791 requires pre-cert        Patient Name: Polly Duque  YOB: 1975    Note:  This is an estimate of charges billed.      Date of [address] Name # units/ charge $$$ charge Daily Total Charge Ongoing Total $$$   7/20/18 Mod eval 1 mod eval 80.57 80.57 80.57   7/23 Ther ex 2 29.49+22.99 52.48 133.05   7/25 Ther Ex x4  29.49+22.99x3 98.46 231.51   08/01 Ex x3  14.58+55.81M3 75.47 306.98   08/03 Ex x3  65.59+27.43H7 75.47 382.45   08/10 Ex,  Neuro x2  33.51+25.88+22.99 82.38 464.83   08/16 Ex 3, Neuro  33.51+22.99x3 102.48 567.31   08/21 Ex, Neuro  33.51+22.99 56.5 623.81   8/23 Ex 3  29.49+22.99x2 75.47 699.28   8/28 Ex 3, Neuro  33.51+22.99x3 102.48 801.76   8/30 Ex 2, Neuro  33.51+22.99x2 79.49 881.25   9/13 Ex 4  29.49+22.99x3 98.46 979.71

## 2018-09-18 NOTE — FLOWSHEET NOTE
Manual Therapy     []  Ther Activities     []  Orthotic fitting/training     []  Orthotic re-check     []  Other         Assessment: [x] Progressing toward goals. [] No change. [] Other:    Short Term Goals: (  9    Treatments)  1. Decrease Pain: to 6/10 with simple ADLS  2. Increase A/P ROM (degrees): of R Shoulder to 100 for increased I with ADLs  3. Increase strength (pounds): increase R  strength to 7 for increased I with [address] tasks  4. Increase function:  DASH score will be 80% functionally impaired or less  5. Independent with Home Exercise Program in 3 sessions        Long Term Goals: (  18  Treatments)  1. Pt to increase R UE ROM         1. All MCPs to 90 flexion  2. All DIPs to 90 flexion  3. All PIPs to 75 degrees flexion  4. Decrease extensor lag of all DIPs and PIPs to -5 or less  2. Increase R  strength to 15 for increased I with [address] tasks        Patient Goals: To get my hand back    Pt. Education:  [x] Yes  [] No  [x] Reviewed Prior HEP/Ed  Method of Education: [x] Verbal  [x] Demo  [] Written  Re:  Comprehension of Education:  [] Verbalizes understanding. [x] Demonstrates understanding. [] Needs review. [x] Demonstrates/verbalizes HEP/Ed previously given. Treatment Plan: 2 times a week for 18 visits to improve gross/fine motor coordination of RUE. Time In/Out: 1320- 1400  Total Treatment Time:  40  Min.       Electronically signed by:Kristina Rosado OTR/L, CHT

## 2018-09-18 NOTE — FLOWSHEET NOTE
requires large amount cues-verbal, visual, and tactile for correct technique. Neuro: NDT UE training in seated. Lateral and retro weight shifting on RUE to increase proprioceptive input with increased weight bearing. Felicita provided to maintain elbow extension and open palm. PNF D1 and D2 flexion and extension AAROM-Held 9/18    Specific Instructions for next treatment: resume UE weight bearing ex    Treatment Charges: Mins Units   []  Modalities     [x]  Ther Exercise 47 3   []  Manual Therapy     []  Ther Activities     []  Aquatics     []  Vasocompression     [x]  Other: Neuro      Total Treatment time 47 min        Assessment: [x] Progressing toward goals-    [] No change. [] Other:          STG: (to be met in 10 treatments)  1. ? Pain: Pt to report decrease in pn in RUE to be 8/10 or less on average-Met  2. ? ROM: Pt to increase ROM of R Shoulder flex 120, abd 90, ER 35, IR 80 ELBOW ext 5, WRIST flex 75 ext 35, RD 18, UD 15, Forearm pronation/supination 45-9/13 Partially Met, Progress toward others  3. ? Strength: Pt to increase strength with shoulder flex/abd/er 4-/5, IR 4/5, elbow flex 4+/5, ext 4/5, forearm sup 4/5 pro 4-/5 wrist flex 3+/5 ext 4-/5 RD 3+/5 UD 4-/5 EPL 3/5 finger abd 3+/5,  5lb, pinch/lateral pinch 1lb-9/13 Partially Met  4. ? Function: Pt to report increased use of B UE with functional tasks at home such as cooking-Progress Toward 9/13  5. Independent with Home Exercise Programs-Progress Toward 9/13  6. Pt to demonstrate and verbalize correct posture-Progress Toward 9/13  LTG: (to be met in 20 treatments)  1. Pt to report decreased in pn in RUE to be 6/10 or less on average  2. Pt to increase ROM of R shoulder flex 135, abd 110, ER 50 Wrist ext 45 UD 25, Forearm pronation/supination 60  3.  Pt to increase strength with shoulder flex/abd/er 4/5, IR 4+/5, elbow flex 5/5, ext 4+/5, forearm sup 4+/5 pro 4+/5 wrist flex 4-/5 ext 4/5 RD 4-/5 UD 4/5 EPL 3+/5 finger abd 4-/5,  10lb, pinch/lateral pinch 5lb   4. Pt to report decreased difficulty with reaching and grabbing an object. Defer goals for Hand as being treated for in OT    G-CODE (Updated 11th Rx)     Functional Limitation: Carrying, Moving, and Handling objects  Functional Assessment Used: Therapist Discretion and Pt report  Current Status Modifier: CL 60-80% impaired  Goal Status Modifier: CK  impaired  Discharge Status Modifier:       Pt. Education:  [] Yes  [] No  [x] Reviewed Prior HEP/Ed  Method of Education: [x] Verbal  [] Demo  [] Written  Comprehension of Education: .   [] Verbalizes understanding  [] Demonstrates understanding. [] Needs review. [x] Demonstrates/verbalizes HEP/Ed previously given. Plan: [x] Continue per plan of care.    [] Other:      Time In: 1001   Time Out: 1058    Electronically signed by Heather Hinton PT on 9/18/2018 at 10:02 AM

## 2018-09-25 ENCOUNTER — HOSPITAL ENCOUNTER (OUTPATIENT)
Dept: PHYSICAL THERAPY | Age: 43
Setting detail: THERAPIES SERIES
Discharge: HOME | End: 2018-09-25
Payer: COMMERCIAL

## 2018-09-25 ENCOUNTER — HOSPITAL ENCOUNTER (OUTPATIENT)
Dept: OCCUPATIONAL THERAPY | Age: 43
Setting detail: THERAPIES SERIES
Discharge: HOME | End: 2018-09-25
Payer: COMMERCIAL

## 2018-09-25 PROCEDURE — 97110 THERAPEUTIC EXERCISES: CPT

## 2018-09-25 PROCEDURE — 97112 NEUROMUSCULAR REEDUCATION: CPT

## 2018-09-25 NOTE — FLOWSHEET NOTE
[x] Isaiah Florence       Occupational Therapy             1st floor       [address] [place], [place]         Phone: [phone]       Fax: [phone] [] [address] [facility] at            [address] [facility] , [address] [facility]     Phone: [phone]     Fax: [phone]      Occupational Therapy Daily Treatment Note    Date:  2018  Patient Name:  Verito Santoyo    :  1975  MRN: [id number]  [facility] [facility] [employer] [facility] [phone]---FU/TE/NE No precert required for initial eval and first 30 visits. For re eval and additional visits, precert required, phone [phone]. Calendar year. Not combined                Medical Diagnosis: Z86.73 S/P CVA  Rehab Codes: FM Skills Loss R29.818, Lack of coordination R27.8    Onset Date: 10-                      Next Dr. Brie Diaz:  Visit# / Total Visits:   Cancels/No Shows: 0/0    Subjective:    Pain:  [x] Yes  [] No Location: Entire RUE Pain Rating: (0-10 scale) 5/10  Pain altered Tx:  [x] No  [] Yes  Action: NA  Pt Comments:    Objective:  Modalities: NA    Exercises:                                        Flow Sheet:  Exercise Reps/Time Weight/Level Comments   Cones, active pronation/supination 10 reps   Completed   Grasp and release foam cubes 1/2 series   Decreased reps    Passive self- ranging of right elbow, wrist,  fingers/thumb 10 reps   Not Completed.     Bilat UE reach with rolling pin  30 reps   Completed   Wooden pegs without velcro 25 reps   Increased reps   Flex bar 10 reps Yellow Completed         Comments/Assessment:  Pt demo'd appropriately. \"Claw hand\" posturing observed with grasp and release activities when pt extends digits. All RUE motion is slow, deliberate, but with fair placement and control; hand control deficit is more pronounced. Pt takes frequent brief rest breaks to passively extend fingers and reduce spasticity.      Specific Instructions for Next

## 2018-09-27 ENCOUNTER — HOSPITAL ENCOUNTER (OUTPATIENT)
Dept: OCCUPATIONAL THERAPY | Age: 43
Setting detail: THERAPIES SERIES
Discharge: HOME | End: 2018-09-27
Payer: COMMERCIAL

## 2018-09-27 NOTE — SIGNIFICANT EVENT
[x] [address] [facility] [address] Occupational Therapy       [address] [facility], 1st Floor       Phone: [phone]       Fax: [phone] [] [facility] Occupational  Therapy at [address] [place] [address]       Phone: [phone]       Fax: [phone]          Occupational Therapy Cancel/No Show note    Date: 2018  Patient: Marianna Iverson  : 1975  MRN: [id number]  Cancels/No Shows to date:      For today's appointment patient:  [x]  Cancelled  []  Rescheduled appointment  []  No-show     Reason given by patient:  []  Patient ill  []  Conflicting appointment  []  No transportation    []  Conflict with work  []  No reason given  []  Other:    Comments:      Electronically signed by: Ulisses PIÑA/DESIRAE WILHELM

## 2018-10-02 ENCOUNTER — HOSPITAL ENCOUNTER (OUTPATIENT)
Dept: PHYSICAL THERAPY | Age: 43
Setting detail: THERAPIES SERIES
Discharge: HOME | End: 2018-10-02
Payer: COMMERCIAL

## 2018-10-02 ENCOUNTER — HOSPITAL ENCOUNTER (OUTPATIENT)
Dept: OCCUPATIONAL THERAPY | Age: 43
Setting detail: THERAPIES SERIES
Discharge: HOME | End: 2018-10-02
Payer: COMMERCIAL

## 2018-10-02 PROCEDURE — 97110 THERAPEUTIC EXERCISES: CPT

## 2018-10-02 NOTE — FLOWSHEET NOTE
[x] [facility]       Occupational Therapy             1st floor       [address] [place], [place]         Phone: [phone]       Fax: [phone] [] [address] [facility] at            [address] [facility] , [address] [facility] [place]     Phone: [phone]     Fax: [phone]      Occupational Therapy Daily Treatment Note    Date:  10/2/2018  Patient Name:  Thom Louie    :  1975  MRN: [id number]  Physician: [address] [facility] [place]: [employer] [facility] [phone]---EO/VT/FX No precert required for initial eval and first 30 visits. For re eval and additional visits, precert required, phone [phone]. Calendar year. Not combined                Medical Diagnosis: Z86.73 S/P CVA  Rehab Codes: FM Skills Loss R29.818, Lack of coordination R27.8    Onset Date: 10-                      Next Dr. Liliane Patiño:  Visit# / Total Visits:   Cancels/No Shows: 0/0    Subjective:    Pain:  [x] Yes  [] No Location: Entire RUE Pain Rating: (0-10 scale) 5/10  Pain altered Tx:  [x] No  [] Yes  Action: NA  Pt Comments:    Objective:  Modalities: NA    Exercises:                                        Flow Sheet:  Exercise Reps/Time Weight/Level Comments   Cones, active pronation/supination 10 reps   Not Completed   Grasp and release foam cubes 1/2 series   Decreased reps    Passive self- ranging of right elbow, wrist,  fingers/thumb 10 reps   Not Completed.     Bilat UE reach with rolling pin  30 reps   Not Completed   Wooden pegs without velcro 25 reps   Increased reps did 10   Flex bar 10 reps Yellow Completed         Comments/Assessment:  Pt demo'd appropriately. \"Claw hand\" posturing observed with grasp and release activities when pt extends digits. All RUE motion is slow, deliberate, but with fair placement and control; hand control deficit is more pronounced. Pt takes frequent brief rest breaks to passively extend fingers and reduce spasticity.  Pt reports having trouble staying on task

## 2018-10-04 ENCOUNTER — HOSPITAL ENCOUNTER (OUTPATIENT)
Dept: OCCUPATIONAL THERAPY | Age: 43
Setting detail: THERAPIES SERIES
Discharge: HOME | End: 2018-10-04
Payer: COMMERCIAL

## 2018-10-09 ENCOUNTER — HOSPITAL ENCOUNTER (OUTPATIENT)
Dept: PHYSICAL THERAPY | Age: 43
Setting detail: THERAPIES SERIES
Discharge: HOME | End: 2018-10-09
Payer: COMMERCIAL

## 2018-10-09 ENCOUNTER — HOSPITAL ENCOUNTER (OUTPATIENT)
Dept: OCCUPATIONAL THERAPY | Age: 43
Setting detail: THERAPIES SERIES
Discharge: HOME | End: 2018-10-09
Payer: COMMERCIAL

## 2018-10-09 PROCEDURE — 97110 THERAPEUTIC EXERCISES: CPT

## 2018-10-09 PROCEDURE — 97112 NEUROMUSCULAR REEDUCATION: CPT

## 2018-10-09 NOTE — FLOWSHEET NOTE
[x] [facility]       Occupational Therapy             1st floor       [address] [facility], [address] [facility] [address]         Phone: [phone]       Fax: [phone] [] [facility] [facility] at            [address] [facility] , [address]     Phone: [phone]     Fax: [phone]      Occupational Therapy Daily Treatment Note    Date:  10/9/2018  Patient Name:  Thom Louie    :  1975  MRN: [id number]  Physician: [address] [facility] [place]: [place] [phone]---OK/XL/ No precert required for initial eval and first 30 visits. For re eval and additional visits, precert required, phone [phone]. Calendar year. Not combined                Medical Diagnosis: Z86.73 S/P CVA  Rehab Codes: FM Skills Loss R29.818, Lack of coordination R27.8    Onset Date: 10-                      Next Dr. Liliane Patiño:  Visit# / Total Visits:   Cancels/No Shows: 0/0    Subjective:    Pain:  [x] Yes  [] No Location: Entire RUE Pain Rating: (0-10 scale) 0/10  Pain altered Tx:  [x] No  [] Yes  Action: NA  Pt Comments:    Objective:  Modalities: NA    Exercises:                                        Flow Sheet:  Exercise Reps/Time Weight/Level Comments   Cones, active pronation/supination 10 reps   Completed   Grasp and release foam cubes 1/2 series   Decreased reps    Passive self- ranging of right elbow, wrist,  fingers/thumb 10 reps   Not Completed.     Bilat UE reach with rolling pin  30 reps   Not Completed   Wooden pegs without velcro 25 reps   Not completed. Increased reps did 10   Flex bar 10 reps Yellow Completed         Comments/Assessment:  Pt demo'd appropriately. \"Claw hand\" posturing observed with grasp and release activities when pt extends digits. All RUE motion is slow, deliberate, but with fair placement and control; hand control deficit is more pronounced. Pt takes frequent brief rest breaks to passively extend fingers and reduce spasticity.  Pt reports having trouble staying on task this date. Pt reports feeling sick and tired due to going to see brother in hospital.     Specific Instructions for Next Treatment:    Treatment Charges: Mins Units   []  Modalities     [x]  Ther Exercise  54 4   []  Manual Therapy     []  Ther Activities     []  Orthotic fitting/training     []  Orthotic re-check     []  Other         Assessment: [x] Progressing toward goals. [] No change. [] Other:    Short Term Goals: (  9    Treatments)  1. Decrease Pain: to 6/10 with simple ADLS  2. Increase A/P ROM (degrees): of R Shoulder to 100 for increased I with ADLs  3. Increase strength (pounds): increase R  strength to 7 for increased I with [address] tasks  4. Increase function:  DASH score will be 80% functionally impaired or less  5. Independent with Home Exercise Program in 3 sessions        Long Term Goals: (  18  Treatments)  1. Pt to increase R UE ROM         1. All MCPs to 90 flexion  2. All DIPs to 90 flexion  3. All PIPs to 75 degrees flexion  4. Decrease extensor lag of all DIPs and PIPs to -5 or less  2. Increase R  strength to 15 for increased I with [address] tasks        Patient Goals: To get my hand back    Pt. Education:  [x] Yes  [] No  [x] Reviewed Prior HEP/Ed  Method of Education: [x] Verbal  [x] Demo  [] Written  Re:  Comprehension of Education:  [] Verbalizes understanding. [x] Demonstrates understanding. [] Needs review. [x] Demonstrates/verbalizes HEP/Ed previously given. Treatment Plan: 2 times a week for 18 visits to improve gross/fine motor coordination of RUE. Time In/Out: 1300 - 1400  Total Treatment Time:  61  Min.       Electronically signed by:Shannon PIÑA/DESIRAE WILHELM

## 2018-10-09 NOTE — FLOWSHEET NOTE
[x] [facility]       Outpatient Physical Therapy       [address]       Phone: [phone]       Fax: [phone]       Physical Therapy Daily Treatment Note    Date:  10/9/2018  Patient Name:  Benito Hobson    :  1975  MRN: [id number]  Physician: Tahir Bassett MD, Keyanna Bowman D.O. Insurance: Medicare  Medical Diagnosis: CVA, unspecified mechanism I63.9                 Rehab Codes: M62.81, M25.611, M25.621, M25.631, M25.641, M79.602, R29.3  Onset date: 10/1/2016                       Next 's appt. :   Visit# / total visits:   Cancels/No Shows: 0/2    Subjective:    Pain:  [x] Yes  [] No           Location: entire R UE  Pain Rating: (0-10 scale) 4/10   Pain altered Tx:  [x] No  [] Yes  Action:  Comments:  Pt reports arm pain at 4/10. Objective:   Modalities:    Precautions:  Exercises:  Exercise Reps/ Time Weight/ Level  Comments   NuStep 8 min Level 2 x Used soft velcro wrap to maintain  on handle          Supine with cane       Chest press 15x  2 lb x 2 lb cane & 1lb cuff weight   Protraction  2 lb    \"   Shoulder Flex 15x 2 lb x   \"   Shoulder IR/ER at 90 °/90 °  15x AAROM x 1 lb cuff weight /no cane   Tricep extension 20x 1lb x Cuff wt-elbow supported          Side lying    With shoulder stabilized   Scapular retraction 15x  AROM x    Shoulder ER 15x  AAROM x    Shoulder abduction 15x  AAROM x    Retro shoulder rolls 15x AROM x    Standing       Wand exercises       -abd 20x 2lb x    -ER/IR  20x 2lb x Cues to keeps elbows at sides            Bicep curl  20x 2 lb x Cuff wt around wrist          Seated       Mat towel slides  20x ea 2 lb x  Flexion, abd  CW, CCW-with cuff wt around wrist   ER stretch on table  3x 15sec  Added 10/9   Other:   Cont ex per above log. Pt performs all ex very slowly, requires assistance with many ex for set-up and large amount cues. Multiple stops during ex to adjust , position.        Neuro: NDT UE training in seated and standing. Standing leaning on hands on table, ant/post weight shifts, and lateral weight shifts. Seated lateral and retro weight shifting on RUE to increase proprioceptive input with increased weight bearing, instructed Pt to push back up with arm. Min A provided to maintain open palm, greatest difficulty with thumb extension. Specific Instructions for next treatment:     Treatment Charges: Mins Units   []  Modalities     [x]  Ther Exercise 47 3   []  Manual Therapy     []  Ther Activities     []  Aquatics     []  Vasocompression     [x]  Other: Neuro  8 --   Total Treatment time 55 min        Assessment: [x] Progressing toward goals-    [] No change. [] Other:          STG: (to be met in 10 treatments)  1. ? Pain: Pt to report decrease in pn in RUE to be 8/10 or less on average-Met  2. ? ROM: Pt to increase ROM of R Shoulder flex 120, abd 90, ER 35, IR 80 ELBOW ext 5, WRIST flex 75 ext 35, RD 18, UD 15, Forearm pronation/supination 45-9/13 Partially Met, Progress toward others  3. ? Strength: Pt to increase strength with shoulder flex/abd/er 4-/5, IR 4/5, elbow flex 4+/5, ext 4/5, forearm sup 4/5 pro 4-/5 wrist flex 3+/5 ext 4-/5 RD 3+/5 UD 4-/5 EPL 3/5 finger abd 3+/5,  5lb, pinch/lateral pinch 1lb-9/13 Partially Met  4. ? Function: Pt to report increased use of B UE with functional tasks at home such as cooking-Progress Toward 9/13  5. Independent with Home Exercise Programs-Progress Toward 9/13  6. Pt to demonstrate and verbalize correct posture-Progress Toward 9/13  LTG: (to be met in 20 treatments)  1. Pt to report decreased in pn in RUE to be 6/10 or less on average  2. Pt to increase ROM of R shoulder flex 135, abd 110, ER 50 Wrist ext 45 UD 25, Forearm pronation/supination 60  3.  Pt to increase strength with shoulder flex/abd/er 4/5, IR 4+/5, elbow flex 5/5, ext 4+/5, forearm sup 4+/5 pro 4+/5 wrist flex 4-/5 ext 4/5 RD 4-/5 UD 4/5 EPL 3+/5 finger abd 4-/5,

## 2018-10-16 ENCOUNTER — HOSPITAL ENCOUNTER (OUTPATIENT)
Dept: OCCUPATIONAL THERAPY | Age: 43
Setting detail: THERAPIES SERIES
Discharge: HOME | End: 2018-10-16
Payer: COMMERCIAL

## 2018-10-18 ENCOUNTER — HOSPITAL ENCOUNTER (OUTPATIENT)
Dept: OCCUPATIONAL THERAPY | Age: 43
Setting detail: THERAPIES SERIES
Discharge: HOME | End: 2018-10-18
Payer: COMMERCIAL

## 2018-10-20 ENCOUNTER — APPOINTMENT (OUTPATIENT)
Dept: CT IMAGING | Facility: CLINIC | Age: 43
End: 2018-10-20
Payer: COMMERCIAL

## 2018-10-20 ENCOUNTER — HOSPITAL ENCOUNTER (EMERGENCY)
Facility: CLINIC | Age: 43
Discharge: HOME | End: 2018-10-21
Attending: EMERGENCY MEDICINE
Payer: COMMERCIAL

## 2018-10-20 VITALS
DIASTOLIC BLOOD PRESSURE: 95 MMHG | SYSTOLIC BLOOD PRESSURE: 114 MMHG | HEART RATE: 81 BPM | TEMPERATURE: 97.9 F | RESPIRATION RATE: 18 BRPM | OXYGEN SATURATION: 100 %

## 2018-10-20 DIAGNOSIS — J01.00 ACUTE NON-RECURRENT MAXILLARY SINUSITIS: Primary | ICD-10-CM

## 2018-10-20 PROCEDURE — 6370000000 HC RX 637 (ALT 250 FOR IP): Performed by: EMERGENCY MEDICINE

## 2018-10-20 PROCEDURE — 6360000002 HC RX W HCPCS: Performed by: EMERGENCY MEDICINE

## 2018-10-20 PROCEDURE — 96372 THER/PROPH/DIAG INJ SC/IM: CPT

## 2018-10-20 PROCEDURE — 70450 CT HEAD/BRAIN W/O DYE: CPT

## 2018-10-20 PROCEDURE — 99284 EMERGENCY DEPT VISIT MOD MDM: CPT

## 2018-10-20 PROCEDURE — 2500000003 HC RX 250 WO HCPCS: Performed by: EMERGENCY MEDICINE

## 2018-10-20 RX ORDER — PSEUDOEPHEDRINE HYDROCHLORIDE 30 MG/1
30 TABLET ORAL ONCE
Status: COMPLETED | OUTPATIENT
Start: 2018-10-20 | End: 2018-10-20

## 2018-10-20 RX ORDER — FLUTICASONE PROPIONATE 50 MCG
1 SPRAY, SUSPENSION (ML) NASAL DAILY
Qty: 1 BOTTLE | Refills: 0 | Status: SHIPPED | OUTPATIENT
Start: 2018-10-20 | End: 2019-06-24 | Stop reason: ALTCHOICE

## 2018-10-20 RX ORDER — PSEUDOEPHEDRINE HYDROCHLORIDE 30 MG/1
30 TABLET ORAL EVERY 6 HOURS PRN
Qty: 15 TABLET | Refills: 1 | Status: SHIPPED | OUTPATIENT
Start: 2018-10-20 | End: 2018-10-25

## 2018-10-20 RX ORDER — IBUPROFEN 600 MG/1
600 TABLET ORAL EVERY 6 HOURS PRN
Qty: 30 TABLET | Refills: 0 | Status: SHIPPED | OUTPATIENT
Start: 2018-10-20 | End: 2019-01-11

## 2018-10-20 RX ORDER — KETOROLAC TROMETHAMINE 30 MG/ML
30 INJECTION, SOLUTION INTRAMUSCULAR; INTRAVENOUS ONCE
Status: COMPLETED | OUTPATIENT
Start: 2018-10-20 | End: 2018-10-20

## 2018-10-20 RX ADMIN — PHENYLEPHRINE HYDROCHLORIDE 2 SPRAY: 0.5 SPRAY NASAL at 22:47

## 2018-10-20 RX ADMIN — KETOROLAC TROMETHAMINE 30 MG: 30 INJECTION, SOLUTION INTRAMUSCULAR at 22:45

## 2018-10-20 RX ADMIN — PSEUDOEPHEDRINE HCL 30 MG: 30 TABLET, FILM COATED ORAL at 22:43

## 2018-10-20 ASSESSMENT — PAIN SCALES - GENERAL
PAINLEVEL_OUTOF10: 9
PAINLEVEL_OUTOF10: 6

## 2018-10-20 ASSESSMENT — ENCOUNTER SYMPTOMS
EYES NEGATIVE: 1
GASTROINTESTINAL NEGATIVE: 1
RESPIRATORY NEGATIVE: 1

## 2018-10-20 ASSESSMENT — PAIN DESCRIPTION - PROGRESSION: CLINICAL_PROGRESSION: GRADUALLY IMPROVING

## 2018-10-21 NOTE — ED PROVIDER NOTES
NASAL SPRAY    1 spray by Each Nare route daily    IBUPROFEN (ADVIL;MOTRIN) 600 MG TABLET    Take 1 tablet by mouth every 6 hours as needed for Pain    PSEUDOEPHEDRINE (DECONGESTANT) 30 MG TABLET    Take 1 tablet by mouth every 6 hours as needed for Congestion       (Please note that portions of this note were completed with a voice recognition program.  Efforts were made to edit the dictations but occasionally words are mis-transcribed.)    Denis MD  Attending Emergency Physician             Bere Price MD  10/20/18 0315

## 2018-10-21 NOTE — ED NOTES
Pt. Resting on cart. RR equal and unlabored. NaD noted. Pt. States headache is a little better. Pt. Denies any concerns. Will continue to monitor.        Mikayla Alexis RN  10/20/18 9573

## 2018-10-23 ENCOUNTER — HOSPITAL ENCOUNTER (OUTPATIENT)
Dept: OCCUPATIONAL THERAPY | Age: 43
Setting detail: THERAPIES SERIES
Discharge: HOME | End: 2018-10-23
Payer: COMMERCIAL

## 2018-10-23 ENCOUNTER — HOSPITAL ENCOUNTER (OUTPATIENT)
Dept: PHYSICAL THERAPY | Age: 43
Setting detail: THERAPIES SERIES
Discharge: HOME | End: 2018-10-23
Payer: COMMERCIAL

## 2018-10-23 PROCEDURE — 97110 THERAPEUTIC EXERCISES: CPT

## 2018-10-23 PROCEDURE — 97112 NEUROMUSCULAR REEDUCATION: CPT

## 2018-10-23 NOTE — PRE-CERTIFICATION NOTE
Medicare Cap   [x] Physical Therapy  [] Speech Therapy  [] Occupational therapy  *PT and Speech caps combine      $2010 Cap limit < kx modifier needed < $7551 requires pre-cert        Patient Name: Avinash Valles  YOB: 1975    Note:  This is an estimate of charges billed.      Date of [address] Name # units/ charge $$$ charge Daily Total Charge Ongoing Total $$$   7/20/18 Mod eval 1 mod eval 80.57 80.57 80.57   7/23 Ther ex 2 29.49+22.99 52.48 133.05   7/25 Ther Ex x4  29.49+22.99x3 98.46 231.51   08/01 Ex x3  68.48+05.15T6 75.47 306.98   08/03 Ex x3  87.68+20.79B8 75.47 382.45   08/10 Ex,  Neuro x2  33.51+25.88+22.99 82.38 464.83   08/16 Ex 3, Neuro  33.51+22.99x3 102.48 567.31   08/21 Ex, Neuro  33.51+22.99 56.5 623.81   8/23 Ex 3  29.49+22.99x2 75.47 699.28   8/28 Ex 3, Neuro  33.51+22.99x3 102.48 801.76   8/30 Ex 2, Neuro  33.51+22.99x2 79.49 881.25   9/13 Ex 4  29.49+22.99x3 98.46 979.71   9/18 Ex 3  29.49+22.99x2 75.47 1055.18   9/25 Ex 3, Neuro  33.51+22.99x3 102.48 1157.66   10/2 Ex 3  29.49+22.99x2 75.47 1233.13   10/9 Ex 3  29.49+22.99x2 75.47 1308.60   10/23 Ex 3, Neuro  33.51+22.99x3 102.48 1411.08

## 2018-10-23 NOTE — FLOWSHEET NOTE
[x] [facility]       Occupational Therapy             1st floor       [address] [place], [place]         Phone: [phone]       Fax: [phone] [] [address] [facility] at            [address] [facility] , [address] [place] [address]     Phone: [phone]     Fax: [phone]      Occupational Therapy Daily Treatment Note    Date:  10/23/2018  Patient Name:  Ericka Allred    :  1975  MRN: [id number]  Physician: [address] [facility] [place]: [employer] [facility] [phone]---FG/SM/YK No precert required for initial eval and first 30 visits. For re eval and additional visits, precert required, phone [phone]. Calendar year. Not combined                Medical Diagnosis: Z86.73 S/P CVA  Rehab Codes: FM Skills Loss R29.818, Lack of coordination R27.8    Onset Date: 10-                      Next Dr. Noonan Block:  Visit# / Total Visits:   Cancels/No Shows: 0/0    Subjective:    Pain:  [x] Yes  [] No Location: Entire RUE Pain Rating: (0-10 scale) 0/10  Pain altered Tx:  [x] No  [] Yes  Action: NA  Pt Comments:    Objective:  Modalities: NA    Exercises:                                        Flow Sheet:  Exercise Reps/Time Weight/Level Comments   Cones, active pronation/supination 10 reps   Completed   Grasp and release foam cubes 1/2 series   Decreased reps    Passive self- ranging of right elbow, wrist,  fingers/thumb 10 reps   Not Completed.     Bilat UE reach with rolling pin  30 reps   Not Completed   Wooden pegs without velcro 25 reps   Not completed. Increased reps did 10   Flex bar 10 reps Yellow  not Completed         Comments/Assessment:  Pt demo'd appropriately. \"Claw hand\" posturing observed with grasp and release activities when pt extends digits. All RUE motion is slow, deliberate, but with fair placement and control; hand control deficit is more pronounced. Pt takes frequent brief rest breaks to passively extend fingers and reduce spasticity.  Pt reports having trouble

## 2018-10-25 ENCOUNTER — HOSPITAL ENCOUNTER (OUTPATIENT)
Dept: PHYSICAL THERAPY | Age: 43
Setting detail: THERAPIES SERIES
Discharge: HOME | End: 2018-10-25
Payer: COMMERCIAL

## 2018-10-25 ENCOUNTER — HOSPITAL ENCOUNTER (OUTPATIENT)
Dept: OCCUPATIONAL THERAPY | Age: 43
Setting detail: THERAPIES SERIES
Discharge: HOME | End: 2018-10-25
Payer: COMMERCIAL

## 2018-10-25 PROCEDURE — 97110 THERAPEUTIC EXERCISES: CPT

## 2018-10-25 PROCEDURE — 97112 NEUROMUSCULAR REEDUCATION: CPT

## 2018-10-25 NOTE — PROGRESS NOTES
movement/Resistive activity [] With Sedentary activity    Pain Altered Tx: []Yes []No  Action Taken:    Objective:  Tests/Measurements:  Current Functional Level:  93% functionally impaired as measured with the DASH Functional Survey. 0-100 scale, with 0 = no Deficits  Progress Note DASH Score: 69%   STRENGTH  10-26-18      RIGHT LEFT    8 inc 5.5 42   Lateral pinch 4 inc 1.5 14   2 point pinch 1 same 8   3 jaw pinch Not able same 10       STRENGTH      RIGHT LEFT    2.5 42   Lateral pinch 2.5 14   2 point pinch 1 8   3 jaw pinch Not able 10     DIGITS  10-25-18         Extension/Flexion   INDEX MIDDLE RING LITTLE   MCP +25/75 +30/82 +18/80 +30/80   PIP -1/80 -45/75 -45/75 -46/80   DIP +5/46 0/42 +5/45 +10/75            DIGITS         Extension/Flexion   INDEX MIDDLE RING LITTLE   MCP +20/60 +30/65 +30/90 +30/92   PIP -10/95 -20/95 -23/100 -23/80   DIP +10/45 +10/40 +10/45 +15/55            COORDINATION  - Fine Motor UNABLE TO TEST AT THIS TIME     Right in seconds Percentile Left in seconds Percentile   9 Hole Peg Test         SHOULDER      Right ROM Right Strength   Flexion 113   Inc 30 4-/5   Extension -8 inc 12 4-/5   Abduction 64 4-/5   Adduction 20 4-/5         Problems:     [x] Pain       [x] ROM      [x] Strength  [x] Function         [] Coordination    [] Sensation                Short Term Goals: (  9    Treatments)  1. Decrease Pain: to 6/10 with simple ADLS MET  2. Increase A/P ROM (degrees): of R Shoulder to 100 for increased I with ADLs MET  3. Increase strength (pounds): increase R  strength to 7 for increased I with [facility] tasks MET  4. Increase function:  DASH score will be 80% functionally impaired or less MET  5. Independent with Home Exercise Program in 3 sessions MET      Long Term Goals: (  18  Treatments)  1. Pt to increase R UE ROM   1. All MCPs to 90 flexion  RING and SMALL MET  2. All DIPs to 90 flexion INDEX RING LONG MET  3.  All PIPs to 75 degrees flexion NOT MET

## 2018-10-30 ENCOUNTER — HOSPITAL ENCOUNTER (OUTPATIENT)
Dept: OCCUPATIONAL THERAPY | Age: 43
Setting detail: THERAPIES SERIES
Discharge: HOME | End: 2018-10-30
Payer: COMMERCIAL

## 2018-10-30 ENCOUNTER — HOSPITAL ENCOUNTER (OUTPATIENT)
Dept: PHYSICAL THERAPY | Age: 43
Setting detail: THERAPIES SERIES
Discharge: HOME | End: 2018-10-30
Payer: COMMERCIAL

## 2018-10-30 PROCEDURE — 97110 THERAPEUTIC EXERCISES: CPT

## 2018-10-30 PROCEDURE — 97140 MANUAL THERAPY 1/> REGIONS: CPT

## 2018-10-30 NOTE — FLOWSHEET NOTE
[x] [facility]       Occupational Therapy             1st floor       [address] [place], [place]         Phone: [phone]       Fax: [phone] [] [address] [facility] at            [address] [facility] , [address] [place] [address]     Phone: [phone]     Fax: [phone]      Occupational Therapy Daily Treatment Note    Date:  10/30/2018  Patient Name:  Avinash Valles    :  1975  MRN: [id number]  Physician: 1317 [facility] [place]: [place] [phone]---HS/VI/EG No precert required for initial eval and first 30 visits. For re eval and additional visits, precert required, phone [phone]. Calendar year. Not combined                Medical Diagnosis: Z86.73 S/P CVA  Rehab Codes: FM Skills Loss R29.818, Lack of coordination R27.8    Onset Date: 10-                      Next Dr. Monty Padilla:  Visit# / Total Visits:   Cancels/No Shows: 0/0    Subjective:    Pain:  [x] Yes  [] No Location: Entire RUE Pain Rating: (0-10 scale) 5/10  Pain altered Tx:  [x] No  [] Yes  Action: NA  Pt Comments:    Objective:  Modalities: NA    Exercises:                                        Flow Sheet:  Exercise Reps/Time Weight/Level Comments   Cones, active pronation/supination 10 reps   Completed   Grasp and release foam cubes 1/2 series   Decreased reps    Passive self- ranging of right elbow, wrist,  fingers/thumb 10 reps   Not Completed.     Bilat UE reach with rolling pin  30 reps   Not Completed   Wooden pegs without velcro 25 reps    10   Flex bar 10 reps Yellow   Completed   Hand Maze 1 series red Initiated this date         Comments/Assessment:  Pt demo'd appropriately. \"Claw hand\" posturing observed with grasp and release activities when pt extends digits. All RUE motion is slow, deliberate, but with fair placement and control; hand control deficit is more pronounced. Pt takes frequent brief rest breaks to passively extend fingers and reduce spasticity.  Pt reports

## 2018-10-30 NOTE — FLOWSHEET NOTE
Forearm pronation/supination 60  3. Pt to increase strength with shoulder flex/abd/er 4/5, IR 4+/5, elbow flex 5/5, ext 4+/5, forearm sup 4+/5 pro 4+/5 wrist flex 4-/5 ext 4/5 RD 4-/5 UD 4/5 EPL 3+/5 finger abd 4-/5,  10lb, pinch/lateral pinch 5lb   4. Pt to report decreased difficulty with reaching and grabbing an object. Defer goals for Hand as being treated for in OT    G-CODE (Updated 11th Rx)     Functional Limitation: Carrying, Moving, and Handling objects  Functional Assessment Used: Therapist Discretion and Pt report  Current Status Modifier: CL 60-80% impaired  Goal Status Modifier: CK  impaired  Discharge Status Modifier:       Pt. Education:  [] Yes  [] No  [x] Reviewed Prior HEP/Ed  Method of Education: [x] Verbal  [] Demo  [] Written  Comprehension of Education: .   [] Verbalizes understanding  [] Demonstrates understanding. [] Needs review. [x] Demonstrates/verbalizes HEP/Ed previously given. Plan: [x] Continue per plan of care.    [] Other:      Time In: 1400   Time Out: 4993    Electronically signed by Patricia Amaya PT on 10/30/2018 at 2:09 PM

## 2018-11-01 ENCOUNTER — HOSPITAL ENCOUNTER (OUTPATIENT)
Dept: OCCUPATIONAL THERAPY | Age: 43
Setting detail: THERAPIES SERIES
Discharge: HOME | End: 2018-11-01
Payer: COMMERCIAL

## 2018-11-01 ENCOUNTER — HOSPITAL ENCOUNTER (OUTPATIENT)
Dept: PHYSICAL THERAPY | Age: 43
Setting detail: THERAPIES SERIES
Discharge: HOME | End: 2018-11-01
Payer: COMMERCIAL

## 2018-11-01 PROCEDURE — 97140 MANUAL THERAPY 1/> REGIONS: CPT

## 2018-11-01 PROCEDURE — 97110 THERAPEUTIC EXERCISES: CPT

## 2018-11-01 NOTE — FLOWSHEET NOTE
per above log, Pt performs all ex very slowly this date, increased time needed for set-up and performing all ex. Pt unable to count, needs assistance in counting. Cont dry needling R UE per to decrease tone, see separate note. Neuro: NDT UE training in standing. Seated lateral and retro weight shifting on RUE to increase proprioceptive input with increased weight bearing, instructed Pt to push back up with arm. Min A provided to maintain open palm, greatest difficulty with thumb extension. Specific Instructions for next treatment: Recheck soon    Treatment Charges: Mins Units   []  Modalities     [x]  Ther Exercise 38 3   []  Manual Therapy     []  Ther Activities     []  Aquatics     []  Vasocompression     [x]  Other: Neuro 5    Total Treatment time 44 min        Assessment: [x] Progressing toward goals-    [] No change. [] Other:          STG: (to be met in 10 treatments)  1. ? Pain: Pt to report decrease in pn in RUE to be 8/10 or less on average-Met  2. ? ROM: Pt to increase ROM of R Shoulder flex 120, abd 90, ER 35, IR 80 ELBOW ext 5, WRIST flex 75 ext 35, RD 18, UD 15, Forearm pronation/supination 45-9/13 Partially Met, Progress toward others  3. ? Strength: Pt to increase strength with shoulder flex/abd/er 4-/5, IR 4/5, elbow flex 4+/5, ext 4/5, forearm sup 4/5 pro 4-/5 wrist flex 3+/5 ext 4-/5 RD 3+/5 UD 4-/5 EPL 3/5 finger abd 3+/5,  5lb, pinch/lateral pinch 1lb-9/13 Partially Met  4. ? Function: Pt to report increased use of B UE with functional tasks at home such as cooking-Progress Toward 9/13  5. Independent with Home Exercise Programs-Progress Toward 9/13  6. Pt to demonstrate and verbalize correct posture-Progress Toward 9/13  LTG: (to be met in 20 treatments)  1. Pt to report decreased in pn in RUE to be 6/10 or less on average  2. Pt to increase ROM of R shoulder flex 135, abd 110, ER 50 Wrist ext 45 UD 25, Forearm pronation/supination 60  3.  Pt to increase strength with shoulder

## 2018-11-06 ENCOUNTER — HOSPITAL ENCOUNTER (OUTPATIENT)
Dept: OCCUPATIONAL THERAPY | Age: 43
Setting detail: THERAPIES SERIES
Discharge: HOME | End: 2018-11-06
Payer: COMMERCIAL

## 2018-11-06 ENCOUNTER — HOSPITAL ENCOUNTER (OUTPATIENT)
Dept: PHYSICAL THERAPY | Age: 43
Setting detail: THERAPIES SERIES
Discharge: HOME | End: 2018-11-06
Payer: COMMERCIAL

## 2018-11-06 PROCEDURE — 97110 THERAPEUTIC EXERCISES: CPT

## 2018-11-06 PROCEDURE — 97140 MANUAL THERAPY 1/> REGIONS: CPT

## 2018-11-06 NOTE — FLOWSHEET NOTE
Treatments)  1. Decrease Pain: to 6/10 with simple ADLS  2. Increase A/P ROM (degrees): of R Shoulder to 100 for increased I with ADLs  3. Increase strength (pounds): increase R  strength to 7 for increased I with [facility] tasks  4. Increase function:  DASH score will be 80% functionally impaired or less  5. Independent with Home Exercise Program in 3 sessions        Long Term Goals: (  18  Treatments)  1. Pt to increase R UE ROM         1. All MCPs to 90 flexion  2. All DIPs to 90 flexion  3. All PIPs to 75 degrees flexion  4. Decrease extensor lag of all DIPs and PIPs to -5 or less  2. Increase R  strength to 15 for increased I with [facility] tasks        Patient Goals: To get my hand back    Pt. Education:  [x] Yes  [] No  [x] Reviewed Prior HEP/Ed  Method of Education: [x] Verbal  [x] Demo  [] Written  Re:  Comprehension of Education:  [] Verbalizes understanding. [x] Demonstrates understanding. [] Needs review. [x] Demonstrates/verbalizes HEP/Ed previously given. Treatment Plan: 2 times a week for 30 visits to improve gross/fine motor coordination of RUE. Time In/Out: 1400 - 1500  Total Treatment Time:  61  Min.       Electronically signed by:Shannon PIÑA/DESIRAE WILHELM

## 2018-11-08 ENCOUNTER — HOSPITAL ENCOUNTER (OUTPATIENT)
Dept: OCCUPATIONAL THERAPY | Age: 43
Setting detail: THERAPIES SERIES
Discharge: HOME | End: 2018-11-08
Payer: COMMERCIAL

## 2018-11-13 ENCOUNTER — HOSPITAL ENCOUNTER (OUTPATIENT)
Dept: PHYSICAL THERAPY | Age: 43
Setting detail: THERAPIES SERIES
Discharge: HOME | End: 2018-11-13
Payer: COMMERCIAL

## 2018-11-13 ENCOUNTER — HOSPITAL ENCOUNTER (OUTPATIENT)
Dept: OCCUPATIONAL THERAPY | Age: 43
Setting detail: THERAPIES SERIES
Discharge: HOME | End: 2018-11-13
Payer: COMMERCIAL

## 2018-11-13 PROCEDURE — 97110 THERAPEUTIC EXERCISES: CPT

## 2018-11-13 PROCEDURE — 97140 MANUAL THERAPY 1/> REGIONS: CPT

## 2018-11-13 NOTE — FLOWSHEET NOTE
[x] Ky Ho       Outpatient Physical Therapy       [address]       Phone: [phone]       Fax: [phone]       Physical Therapy Daily Treatment Note    Date:  2018  Patient Name:  Omar Dickerson    :  1975  MRN: [id number]  Physician: Philip Goins MD, Soraida Pan D.O. Insurance: [facility] (limit 30 Rx)  Medical Diagnosis: CVA, unspecified mechanism I63.9                 Rehab Codes: M62.81, M25.611, M25.621, M25.631, M25.641, M79.602, R29.3  Onset date: 10/1/2016                       Next 's appt. :   Visit# / total visits:    Cancels/No Shows:     Subjective:    Pain:  [x] Yes  [] No           Location: entire R UE  Pain Rating: (0-10 scale) 6/10   Pain altered Tx:  [x] No  [] Yes  Action:  Comments:  Pt reports reaching is a little better since beginning PT. Objective:    Modalities:    Precautions:  Exercises:  Exercise Reps/ Time Weight/ Level  Comments   NuStep 6 min Level 2 x Used soft velcro wrap (wellgrip) to maintain  on handle          Supine       Chest press w/cane 20x  x 2 lb cane & 2lb cuff weight   Protraction 20x  x   \"   Shoulder Flex w/cane 20x  x   \"   Shoulder IR/ER at 90 °/90 °  15x AAROM x 1 lb cuff weight /no cane   Tricep extension 20x 2 lb x Cuff wt-elbow supported   Shoulder flex  1lb  Cuff weight   Pec stretch 3x 30sec, 2lb x Cuff wt, arm hanging off side of table   Side lying    With shoulder stabilized   Scapular retraction 15x 1lb x Cuff weight; Increased weight    Shoulder ER  AAROM  Towel roll under elbow          Shoulder abduction  A/AAROM     Retro shoulder rolls 15x 1lb x Cuff weight;  Increased weight    Horiz abd  AAROM     Standing       Wand exercises       -abd 15x 2lb     -ER/IR  15x 2lb  Cues to keeps elbows at sides            Bicep curl  25x 2 lb  Cuff wt around wrist          Seated       Mat towel slides  15x 2 lb   Flexion, abd  CW, CCW-with cuff wt around wrist; 10/23 orange slide tube for abd   ER stretch on table  3x 15sec     Other:  Cont ex per above log. Pt unable to count, needs assistance in counting. Cont dry needling R UE per to decrease tone, see separate note. Neuro: Held 11/1-  NDT UE training in standing. Seated lateral and retro weight shifting on RUE to increase proprioceptive input with increased weight bearing, instructed Pt to push back up with arm. Min A provided to maintain open palm, greatest difficulty with thumb extension. Specific Instructions for next treatment:     Treatment Charges: Mins Units   []  Modalities     [x]  Ther Exercise 41 3   []  Manual Therapy     []  Ther Activities     []  Aquatics     []  Vasocompression     []  Other: Neuro     Total Treatment time 41 min        Assessment: [x] Progressing toward goals-    [] No change. [] Other:          STG: (to be met in 10 treatments)  1. ? Pain: Pt to report decrease in pn in RUE to be 8/10 or less on average-Met  2. ? ROM: Pt to increase ROM of R Shoulder flex 120, abd 90, ER 35, IR 80 ELBOW ext 5, WRIST flex 75 ext 35, RD 18, UD 15, Forearm pronation/supination 45-9/13 Partially Met, Progress toward others  3. ? Strength: Pt to increase strength with shoulder flex/abd/er 4-/5, IR 4/5, elbow flex 4+/5, ext 4/5, forearm sup 4/5 pro 4-/5 wrist flex 3+/5 ext 4-/5 RD 3+/5 UD 4-/5 EPL 3/5 finger abd 3+/5,  5lb, pinch/lateral pinch 1lb-9/13 Partially Met  4. ? Function: Pt to report increased use of B UE with functional tasks at home such as cooking-Progress Toward 9/13  5. Independent with Home Exercise Programs-Progress Toward 9/13  6. Pt to demonstrate and verbalize correct posture-Progress Toward 9/13  LTG: (to be met in 20 treatments)  1. Pt to report decreased in pn in RUE to be 6/10 or less on average  2. Pt to increase ROM of R shoulder flex 135, abd 110, ER 50 Wrist ext 45 UD 25, Forearm pronation/supination 60  3.  Pt to increase strength with shoulder flex/abd/er

## 2018-11-15 ENCOUNTER — APPOINTMENT (OUTPATIENT)
Dept: PHYSICAL THERAPY | Age: 43
End: 2018-11-15
Payer: COMMERCIAL

## 2018-11-20 ENCOUNTER — HOSPITAL ENCOUNTER (OUTPATIENT)
Dept: OCCUPATIONAL THERAPY | Age: 43
Setting detail: THERAPIES SERIES
Discharge: HOME | End: 2018-11-20
Payer: COMMERCIAL

## 2018-11-20 ENCOUNTER — HOSPITAL ENCOUNTER (OUTPATIENT)
Dept: PHYSICAL THERAPY | Age: 43
Setting detail: THERAPIES SERIES
Discharge: HOME | End: 2018-11-20
Payer: COMMERCIAL

## 2018-11-20 PROCEDURE — 97140 MANUAL THERAPY 1/> REGIONS: CPT

## 2018-11-20 PROCEDURE — 97110 THERAPEUTIC EXERCISES: CPT

## 2018-11-20 PROCEDURE — G8985 CARRY GOAL STATUS: HCPCS

## 2018-11-20 PROCEDURE — G8984 CARRY CURRENT STATUS: HCPCS

## 2018-11-20 NOTE — FLOWSHEET NOTE
[x] Isaiah Burke       Occupational Therapy             1st floor       [address], [place]         Phone: [phone]       Fax: [phone] [] [address] [facility] at            [address] [facility] , [address] [place] [address]     Phone: [phone]     Fax: [phone]      Occupational Therapy Daily Treatment Note    Date:  2018  Patient Name:  Abiel Rizo    :  1975  MRN: [id number]  Physician: Brittney Loza         Insurance: [place] [phone]---PX/SR/RB No precert required for initial eval and first 30 visits. For re eval and additional visits, precert required, phone [phone]. Calendar year. Not combined                Medical Diagnosis: Z86.73 S/P CVA  Rehab Codes: FM Skills Loss R29.818, Lack of coordination R27.8    Onset Date: 10-                      Next Dr. Abel Caldwell:  Visit# / Total Visits:   Cancels/No Shows: 0/0    Subjective:    Pain:  [x] Yes  [] No Location: Entire RUE Pain Rating: (0-10 scale) 7/10  Pain altered Tx:  [x] No  [] Yes  Action: NA  Pt Comments:    Objective:  Modalities: NA    Exercises:                                        Flow Sheet:  Exercise Reps/Time Weight/Level Comments   Cones, active pronation/supination 10 reps   Completed   Grasp and release foam cubes 1/2 series   Decreased reps    Passive self- ranging of right elbow, wrist,  fingers/thumb 10 reps   Not Completed.     Bilat UE reach with rolling pin  30 reps   Not Completed   Wooden pegs without velcro 25     compelted                             10 reps red   Completed   Hand Maze 1 series red completed   Smart mirror 10  Not completed         Comments/Assessment:  Pt demo'd appropriately. \"Claw hand\" posturing observed with grasp and release activities when pt extends digits. All RUE motion is slow, deliberate, but with fair placement and control; hand control deficit is more pronounced.  Pt takes frequent brief rest breaks to passively extend fingers and

## 2018-11-20 NOTE — FLOWSHEET NOTE
15sec x    Other:  Cont ex per above log. Pt unable to count, needs assistance in counting. Pt requires frequent rest breaks this date due to headache. Cont dry needling R UE per to decrease tone, see separate note. Quick Dash 55% impaired    Neuro: NDT UE training in standing. Seated lateral and retro weight shifting on RUE to increase proprioceptive input with increased weight bearing, instructed Pt to push back up with arm. Min A provided to maintain open palm, greatest difficulty with thumb extension. ROM A/P STRENGTH  11/20/2018    RIGHT RIGHT        SHLD FLEX  4   SHLD ABD  4-   SHLD ER  4-   SHLD IR  4   SUPRASPINATUS          ELBOW FLEX  4   ELBOW EXT. 4-   Pronation  4   Supination  3+       Specific Instructions for next treatment:     Treatment Charges: Mins Units   []  Modalities     [x]  Ther Exercise 38 3   []  Manual Therapy     []  Ther Activities      []  Aquatics     []  Vasocompression     []  Other: Neuro 4 --   Total Treatment time 42 min        Assessment: [x] Progressing toward goals-    [] No change. [] Other:          STG: (to be met in 10 treatments)  1. ? Pain: Pt to report decrease in pn in RUE to be 8/10 or less on average-Met  2. ? ROM: Pt to increase ROM of R Shoulder flex 120, abd 90, ER 35, IR 80 ELBOW ext 5, WRIST flex 75 ext 35, RD 18, UD 15, Forearm pronation/supination 45-9/13 Partially Met, Progress toward others  3. ? Strength: Pt to increase strength with shoulder flex/abd/er 4-/5, IR 4/5, elbow flex 4+/5, ext 4/5, forearm sup 4/5 pro 4-/5-11/20 Met for shoulder, progress toward others  4. ? Function: Pt to report increased use of B UE with functional tasks at home such as cooking-Progress Toward 9/13  5. Independent with Home Exercise Programs-Progress Toward 9/13  6. Pt to demonstrate and verbalize correct posture-Progress Toward 9/13  LTG: (to be met in 20 treatments)  1. Pt to report decreased in pn in RUE to be 6/10 or less on average  2.  Pt to increase ROM

## 2018-11-27 ENCOUNTER — APPOINTMENT (OUTPATIENT)
Dept: OCCUPATIONAL THERAPY | Age: 43
End: 2018-11-27
Payer: COMMERCIAL

## 2018-11-27 ENCOUNTER — HOSPITAL ENCOUNTER (OUTPATIENT)
Dept: OCCUPATIONAL THERAPY | Age: 43
Setting detail: THERAPIES SERIES
Discharge: HOME | End: 2018-11-27
Payer: COMMERCIAL

## 2018-11-27 ENCOUNTER — HOSPITAL ENCOUNTER (OUTPATIENT)
Dept: PHYSICAL THERAPY | Age: 43
Setting detail: THERAPIES SERIES
Discharge: HOME | End: 2018-11-27
Payer: COMMERCIAL

## 2018-11-27 PROCEDURE — 97110 THERAPEUTIC EXERCISES: CPT

## 2018-11-27 PROCEDURE — 97112 NEUROMUSCULAR REEDUCATION: CPT

## 2018-11-27 PROCEDURE — 97140 MANUAL THERAPY 1/> REGIONS: CPT

## 2018-11-27 NOTE — FLOWSHEET NOTE
[x] Zain Prima       Outpatient Physical Therapy       [address]       Phone: [phone]       Fax: [phone]       Physical Therapy Daily Treatment Note    Date:  2018  Patient Name:  Guerrero Contreras    :  1975  MRN: [id number]  Physician: Kristen Roy MD, Duke Pham D.O. Insurance: [facility] (limit 30 Rx)  Medical Diagnosis: CVA, unspecified mechanism I63.9                 Rehab Codes: M62.81, M25.611, M25.621, M25.631, M25.641, M79.602, R29.3  Onset date: 10/1/2016                       Next 's appt. :   Visit# / total visits:    Cancels/No Shows: 4/    Subjective:    Pain:  [x] Yes  [] No           Location: entire R UE  Pain Rating: (0-10 scale) 5/10   Pain altered Tx:  [x] No  [] Yes  Action:  Comments:  Pt arrived 13 min late for appt. Pt reports 5/10 pain.       Objective:    Modalities:    Precautions:  Exercises:  Exercise Reps/ Time Weight/ Level  Comments   NuStep 6 min Level 3 x Used soft velcro wrap (wellgrip) to maintain  on handle; progressed resistance           Supine       Chest press w/cane    2 lb cane & 2lb cuff weight   Protraction      \"   Shoulder Flex w/cane      \"   Shoulder IR/ER at 90 °/90 °   AAROM  1 lb cuff weight /no cane   Tricep extension  2 lb  Cuff wt-elbow supported   Shoulder flex  1lb  Cuff weight   Pec stretch  30sec, 2lb  Cuff wt, arm hanging off side of table   Side lying    With shoulder stabilized   Scapular retraction  1lb  Cuff weight   Shoulder ER  AAROM  Towel roll under elbow          Shoulder abduction  A/AAROM     Retro shoulder rolls  1lb  Cuff weight   Horiz abd  AAROM     Standing       Wand exercises       -abd 15x 2lb x    -ER/IR  15x 2lb x Cues to keeps elbows at sides            Bicep curl  25x 2 lb x Cuff wt around wrist          Seated       Mat towel slides  15x 2 lb x  Flexion, abd  CW, CCW-with cuff wt around wrist   ER stretch on table  3x 15sec x    Other:  Cont ex per above log, limited amount due to Pt arriving late and increased time neuro. Pt unable to count, needs assistance in counting. Cont dry needling R UE per to decrease tone, see separate note. Neuro: NDT UE training in standing and sitting. Seated lateral and retro weight shifting on RUE to increase proprioceptive input with increased weight bearing, instructed Pt to push back up with arm. Standing lateral and retro ant shifting on RUE to increase proprioceptive input with increased weight bearing, instructed Pt to push back up with arm. Min A provided to maintain open palm, greatest difficulty with thumb extension. ROM A/P STRENGTH  11/20/2018    RIGHT RIGHT        SHLD FLEX  4   SHLD ABD  4-   SHLD ER  4-   SHLD IR  4   SUPRASPINATUS          ELBOW FLEX  4   ELBOW EXT. 4-   Pronation  4   Supination  3+       Specific Instructions for next treatment:     Treatment Charges: Mins Units   []  Modalities     [x]  Ther Exercise 34 2   []  Manual Therapy     []  Ther Activities      []  Aquatics     []  Vasocompression     []  Other: Neuro 8 1   Total Treatment time 42 min        Assessment: [x] Progressing toward goals    [] No change. [] Other:          STG: (to be met in 10 treatments)  1. ? Pain: Pt to report decrease in pn in RUE to be 8/10 or less on average-Met  2. ? ROM: Pt to increase ROM of R Shoulder flex 120, abd 90, ER 35, IR 80 ELBOW ext 5, WRIST flex 75 ext 35, RD 18, UD 15, Forearm pronation/supination 45-9/13 Partially Met, Progress toward others  3. ? Strength: Pt to increase strength with shoulder flex/abd/er 4-/5, IR 4/5, elbow flex 4+/5, ext 4/5, forearm sup 4/5 pro 4-/5-11/20 Met for shoulder, progress toward others  4. ? Function: Pt to report increased use of B UE with functional tasks at home such as cooking-Progress Toward 9/13  5. Independent with Home Exercise Programs-Progress Toward 9/13  6.  Pt to demonstrate and verbalize correct posture-Progress Toward 9/13  LTG: (to

## 2018-11-29 ENCOUNTER — HOSPITAL ENCOUNTER (OUTPATIENT)
Dept: OCCUPATIONAL THERAPY | Age: 43
Setting detail: THERAPIES SERIES
Discharge: HOME | End: 2018-11-29
Payer: COMMERCIAL

## 2018-11-29 ENCOUNTER — HOSPITAL ENCOUNTER (OUTPATIENT)
Dept: PHYSICAL THERAPY | Age: 43
Setting detail: THERAPIES SERIES
Discharge: HOME | End: 2018-11-29
Payer: COMMERCIAL

## 2018-11-29 ENCOUNTER — APPOINTMENT (OUTPATIENT)
Dept: OCCUPATIONAL THERAPY | Age: 43
End: 2018-11-29
Payer: COMMERCIAL

## 2018-11-29 PROCEDURE — 97140 MANUAL THERAPY 1/> REGIONS: CPT

## 2018-11-29 PROCEDURE — 97110 THERAPEUTIC EXERCISES: CPT

## 2018-11-29 NOTE — FLOWSHEET NOTE
Posterior Cutaneous C4 [x]  []  []  [x]  []  []    Posterior Cutaneous C5 [x]  [x]  []  [x]  []  []    Posterior Cutaneous C6 [x]  []  []  [x]  []  []    Posterior Cutaneous C7 [x]  []  []  [x]  []  []      No complaints of tenderness this date. Gentle twisting and pistoning needles in biceps and flexor muscles. Needles left in situ 4 min while this PT in room.       Treatment Charges: Mins Units   [x]  Manual Therapy 14 1   Total Treatment time 14 1         Time In:1349            Time Out: 7040    Electronically signed by:  Jason Meléndez PT

## 2018-11-29 NOTE — FLOWSHEET NOTE
Mat towel slides  15x 2 lb x  Flexion, abd  CW, CCW-with cuff wt around wrist   ER stretch on table  3x 15sec x    Other:  Cont ex per above log. Pt unable to count, needs assistance in counting. Cont dry needling R UE per to decrease tone, see separate note. Neuro: NDT UE training in standing and sitting. Seated lateral and retro weight shifting on RUE to increase proprioceptive input with increased weight bearing, instructed Pt to push back up with arm. Standing lateral and retro ant shifting on RUE to increase proprioceptive input with increased weight bearing, instructed Pt to push back up with arm. Min A provided to maintain open palm, greatest difficulty with thumb extension.-Held 11/29      ROM A/P  11/29 STRENGTH  11/20/2018    RIGHT RIGHT        SHLD FLEX 126° 4   SHLD ABD 92° 4-   SHLD ER 32° 4-   SHLD IR 78° 4   SUPRASPINATUS          ELBOW FLEX  4   ELBOW EXT. 3° 4-   Pronation ** 4   Supination ** 3+        Wrist Flex  84°    Wrist Ext 59°    Radial dev 24°    Ulnar dev  31°        Specific Instructions for next treatment:     Treatment Charges: Mins Units   []  Modalities     [x]  Ther Exercise 41 3   []  Manual Therapy     []  Ther Activities      []  Aquatics     []  Vasocompression     []  Other: Neuro -- --   Total Treatment time 41 min        Assessment: [x] Progressing toward goals    [] No change.      [] Other:          STG: (to be met in 10 treatments)  1. ? Pain: Pt to report decrease in pn in RUE to be 8/10 or less on average-Met  2. ? ROM: Pt to increase ROM of R Shoulder flex 120, abd 90, ER 35, IR 80 ELBOW ext 5, WRIST flex 75 ext 35, RD 18, UD 15, Forearm pronation/supination 45-9/13 Partially Met, Progress toward others  3. ? Strength: Pt to increase strength with shoulder flex/abd/er 4-/5, IR 4/5, elbow flex 4+/5, ext 4/5, forearm sup 4/5 pro 4-/5-11/20 Met for shoulder, progress toward others  4. ? Function: Pt to report increased use of B UE with functional tasks at home

## 2018-12-04 ENCOUNTER — HOSPITAL ENCOUNTER (OUTPATIENT)
Dept: PHYSICAL THERAPY | Age: 43
Setting detail: THERAPIES SERIES
Discharge: HOME | End: 2018-12-04
Payer: COMMERCIAL

## 2018-12-04 ENCOUNTER — HOSPITAL ENCOUNTER (OUTPATIENT)
Dept: OCCUPATIONAL THERAPY | Age: 43
Setting detail: THERAPIES SERIES
Discharge: HOME | End: 2018-12-04
Payer: COMMERCIAL

## 2018-12-04 PROCEDURE — 97140 MANUAL THERAPY 1/> REGIONS: CPT

## 2018-12-04 PROCEDURE — 97110 THERAPEUTIC EXERCISES: CPT

## 2018-12-04 PROCEDURE — G8986 CARRY D/C STATUS: HCPCS

## 2018-12-04 PROCEDURE — G8985 CARRY GOAL STATUS: HCPCS

## 2018-12-04 NOTE — FLOWSHEET NOTE
[x] Héctor Azul       Outpatient Physical        Therapy       [address]       Phone: [phone]       Fax: [phone]       Physical Therapy Dry Needling Note    Date:  2018  Patient Name:  Rosa Hanson    :  1975  MRN: [id number]  Diagnosis: CVA, unspecified mechanism I63.9            Subjective:    Pain Rating: (0-10 scale) 7/10          Dry Needling   Homeostatic Point Right Left 0.5\" needle 1.0\" needle 2.0\" needle 3.0\" needle   Supraorbital []  []  []  []  []  []    Infraorbital  []  []  []  []  []  []    Lateral Pectoral []  []  []  []  []  []    Saphenous  []  []  []  []  []  []    Common Fibular (Peroneal) []  []  []  []  []  []    Tibial []  []  []  []  []  []    Deep Fibular (Peroneal) []  []  []  []  []  []    Greater Occipital []  []  []  []  []  []    Greater Auricular []  []  []  []  []  []    Spinal Accessory [x]  []  []  [x]  []  []    Dorsal Scapular [x]  []  []  [x]  []  []    Suprascapular (Infraspinatus) []  []  []  []  []  []    Lateral Antebrachial Cutaneous  [x] x2 []  []  [x]  []  []    Deep Radial [x]  []  []  [x]  []  []    Superior Cluneal []  []  []  []  []  []    Inferior Gluteal  []  []  []  []  []  []    Superficial Radial [x]  []  [x]  []  []  []    Iliotibial []  []  []  []  []  []    Lateral Popliteal  []  []  []  []  []  []    Sural []  []  []  []  []  []    Posterior Cutaneous of T6  []  []  []  []  []  []    Spinous Process of T7 -- centrally placed   [] []  []  []  []    Posterior Cutaneous of L2 []  []  []  []  []  []    Posterior Cutaneous L5 []  []  []  []  []  []          Symptomatic Points Right Left 0.5\" needle 1.0\" needle 2.0\" needle 3.0\" needle   Wrist, finger flexor muscle [x] x3 []  []  [x]  []  []    Distal wrist, palmar surface [x] x3 []  [x]  []  []  []     []  []  []  []  []  []    Biceps  [x] x3 []  []  [x]  []  []    Triceps  [x] x3 []  []  [x]  []  []    Lat shoulder [x] x4 []  []  [x]  []  []    Posterior Cutaneous C4 [x]  []

## 2018-12-04 NOTE — FLOWSHEET NOTE
[x] [facility]       Occupational Therapy             1st floor       [address], [place]         Phone: [phone]       Fax: [phone] [] [address] at            [address] [facility] , [address] [facility]     Phone: [phone]     Fax: [phone]      Occupational Therapy Daily Treatment Note    Date:  2018  Patient Name:  Thom Louie    :  1975  MRN: [id number]  Physician: Sadia Eddy         Insurance: [employer] Syndero [phone]---MP/BD/LG No precert required for initial eval and first 30 visits. For re eval and additional visits, precert required, phone [phone]. Calendar year. Not combined                Medical Diagnosis: Z86.73 S/P CVA  Rehab Codes: FM Skills Loss R29.818, Lack of coordination R27.8    Onset Date: 10-                      Next Dr. Liliane Patiño:  Visit# / Total Visits:   Cancels/No Shows: 0/0    Subjective:    Pain:  [x] Yes  [] No Location: Entire RUE Pain Rating: (0-10 scale) 5/10  Pain altered Tx:  [x] No  [] Yes  Action: NA  Pt Comments:    Objective:  Modalities: NA    Exercises:                                        Flow Sheet:  Exercise Reps/Time Weight/Level Comments   Cones, active pronation/supination 10 reps   NotCompleted   Grasp and release foam cubes 1/2 series   Not competled    Passive self- ranging of right elbow, wrist,  fingers/thumb 10 reps   Not Completed.     Bilat UE reach with rolling pin  30 reps   Not Completed   Wooden pegs without velcro 25     compelted   Flex bar                          10 reps red   Completed   Hand Maze 1 series red Not completed   Smart mirror 10   not completed with tactile and visual cues         Comments/Assessment:  Pt demo'd appropriately. \"Claw hand\" posturing observed with grasp and release activities when pt extends digits. All RUE motion is slow, deliberate, but with fair placement and control; hand control deficit is more pronounced.  Pt takes frequent brief Treatments)  1. Decrease Pain: to 6/10 with simple ADLS  2. Increase A/P ROM (degrees): of R Shoulder to 100 for increased I with ADLs  3. Increase strength (pounds): increase R  strength to 7 for increased I with [facility] tasks  4. Increase function:  DASH score will be 80% functionally impaired or less  5. Independent with Home Exercise Program in 3 sessions        Long Term Goals: (  18  Treatments)  1. Pt to increase R UE ROM         1. All MCPs to 90 flexion  2. All DIPs to 90 flexion  3. All PIPs to 75 degrees flexion  4. Decrease extensor lag of all DIPs and PIPs to -5 or less  2. Increase R  strength to 15 for increased I with [facility] tasks        Patient Goals: To get my hand back    Pt. Education:  [x] Yes  [] No  [x] Reviewed Prior HEP/Ed  Method of Education: [x] Verbal  [x] Demo  [] Written  Re:  Comprehension of Education:  [] Verbalizes understanding. [x] Demonstrates understanding. [] Needs review. [x] Demonstrates/verbalizes HEP/Ed previously given. Treatment Plan: 2 times a week for 30 visits to improve gross/fine motor coordination of RUE. Time In/Out: 1270-2715 Total Treatment Time:  61  Min.       Electronically signed by:Shannon PIÑA/DESIRAE WILHELM

## 2018-12-06 ENCOUNTER — HOSPITAL ENCOUNTER (OUTPATIENT)
Dept: OCCUPATIONAL THERAPY | Age: 43
Setting detail: THERAPIES SERIES
Discharge: HOME | End: 2018-12-06
Payer: COMMERCIAL

## 2018-12-06 PROCEDURE — 97110 THERAPEUTIC EXERCISES: CPT

## 2018-12-06 NOTE — FLOWSHEET NOTE
[x] [facility]       Occupational Therapy             1st floor       [address], [place]         Phone: [phone]       Fax: [phone] [] [address] [facility] at            [address] [facility] , [address] [place] [address]     Phone: [phone]     Fax: [phone]      Occupational Therapy Daily Treatment Note    Date:  2018  Patient Name:  Asif Campos    :  1975  MRN: [id number]  Physician: Layo Rae         Insurance: Prashanth Avalos [phone]---LR/KENROY/TD No precert required for initial eval and first 30 visits. For re eval and additional visits, precert required, phone [phone]. Calendar year. Not combined                Medical Diagnosis: Z86.73 S/P CVA  Rehab Codes: FM Skills Loss R29.818, Lack of coordination R27.8    Onset Date: 10-                      Next Dr. Marks Milind:  Visit# / Total Visits:   Cancels/No Shows: 0/0    Subjective:    Pain:  [x] Yes  [] No Location: Entire RUE Pain Rating: (0-10 scale) 5/10  Pain altered Tx:  [x] No  [] Yes  Action: NA  Pt Comments:    Objective:  Modalities: NA    Exercises:                                        Flow Sheet:  Exercise Reps/Time Weight/Level Comments   Cones, active pronation/supination 10 reps   NotCompleted   Grasp and release foam cubes 1/2 series   Not competled    Passive self- ranging of right elbow, wrist,  fingers/thumb 10 reps   Not Completed.     Bilat UE reach with rolling pin  30 reps   Not Completed   Wooden pegs without velcro 25     compelted   Flex bar                          10 reps red   Completed   Hand Maze 1 series red Not completed   Smart mirror 10   not completed          Comments/Assessment:  Pt demo'd appropriately. \"Claw hand\" posturing observed with grasp and release activities when pt extends digits. All RUE motion is slow, deliberate, but with fair placement and control; hand control deficit is more pronounced.  Pt takes frequent brief rest breaks to passively extend Pain: to 6/10 with simple ADLS  2. Increase A/P ROM (degrees): of R Shoulder to 100 for increased I with ADLs  3. Increase strength (pounds): increase R  strength to 7 for increased I with [address] tasks  4. Increase function:  DASH score will be 80% functionally impaired or less  5. Independent with Home Exercise Program in 3 sessions        Long Term Goals: (  18  Treatments)  1. Pt to increase R UE ROM         1. All MCPs to 90 flexion  2. All DIPs to 90 flexion  3. All PIPs to 75 degrees flexion  4. Decrease extensor lag of all DIPs and PIPs to -5 or less  2. Increase R  strength to 15 for increased I with [address] [place] tasks        Patient Goals: To get my hand back    Pt. Education:  [x] Yes  [] No  [x] Reviewed Prior HEP/Ed  Method of Education: [x] Verbal  [x] Demo  [] Written  Re:  Comprehension of Education:  [] Verbalizes understanding. [x] Demonstrates understanding. [] Needs review. [x] Demonstrates/verbalizes HEP/Ed previously given. Treatment Plan: 2 times a week for 30 visits to improve gross/fine motor coordination of RUE. Time In/Out: 5168-2681 Total Treatment Time:  61  Min.       Electronically signed by:Shannon PIÑA/DESIRAE WILHELM

## 2018-12-11 ENCOUNTER — HOSPITAL ENCOUNTER (OUTPATIENT)
Dept: OCCUPATIONAL THERAPY | Age: 43
Setting detail: THERAPIES SERIES
Discharge: HOME | End: 2018-12-11
Payer: COMMERCIAL

## 2018-12-11 PROCEDURE — 97110 THERAPEUTIC EXERCISES: CPT

## 2018-12-11 NOTE — FLOWSHEET NOTE
[x] Isaiah Turk       Occupational Therapy             1st floor       [address], [place]         Phone: [phone]       Fax: [phone] [] [facility]            [address] [facility] , [address]     Phone: [phone]     Fax: [phone]      Occupational Therapy Daily Treatment Note    Date:  2018  Patient Name:  Stacey Akers    :  1975  MRN: [id number]  Physician: Jason Stallings         Insurance: [address] [place] [address] [address] [phone]---NC/LL/YN No precert required for initial eval and first 30 visits. For re eval and additional visits, precert required, phone [phone]. Calendar year. Not combined                Medical Diagnosis: Z86.73 S/P CVA  Rehab Codes: FM Skills Loss R29.818, Lack of coordination R27.8    Onset Date: 10-                      Next Dr. Jonathan Magaña:  Visit# / Total Visits:   Cancels/No Shows: 0/0    Subjective:    Pain:  [x] Yes  [] No Location: Entire RUE Pain Rating: (0-10 scale) 5/10  Pain altered Tx:  [x] No  [] Yes  Action: NA  Pt Comments:    Objective:  Modalities: NA    Exercises:                                        Flow Sheet:  Exercise Reps/Time Weight/Level Comments   Cones, active pronation/supination 10 reps   NotCompleted   Grasp and release foam cubes 1/2 series   Not competled    Passive self- ranging of right elbow, wrist,  fingers/thumb 10 reps   Not Completed.     Bilat UE reach with rolling pin  30 reps   Not Completed   Wooden pegs without velcro 25     compelted   Flex bar                          10 reps red                        Completed   Hand Maze 1 series red Not completed   Smart mirror 10   not completed          Comments/Assessment:  Pt demo'd appropriately. \"Claw hand\" posturing observed with grasp and release activities when pt extends digits. All RUE motion is slow, deliberate, but with fair placement and control; hand control deficit is more pronounced.  Pt takes frequent brief rest Treatments)  1. Decrease Pain: to 6/10 with simple ADLS  2. Increase A/P ROM (degrees): of R Shoulder to 100 for increased I with ADLs  3. Increase strength (pounds): increase R  strength to 7 for increased I with [address] [place] tasks  4. Increase function:  DASH score will be 80% functionally impaired or less  5. Independent with Home Exercise Program in 3 sessions        Long Term Goals: (  18  Treatments)  1. Pt to increase R UE ROM         1. All MCPs to 90 flexion  2. All DIPs to 90 flexion  3. All PIPs to 75 degrees flexion  4. Decrease extensor lag of all DIPs and PIPs to -5 or less  2. Increase R  strength to 15 for increased I with [address] [place] tasks        Patient Goals: To get my hand back    Pt. Education:  [x] Yes  [] No  [x] Reviewed Prior HEP/Ed  Method of Education: [x] Verbal  [x] Demo  [] Written  Re:  Comprehension of Education:  [] Verbalizes understanding. [x] Demonstrates understanding. [] Needs review. [x] Demonstrates/verbalizes HEP/Ed previously given. Treatment Plan: 2 times a week for 30 visits to improve gross/fine motor coordination of RUE. Time In/Out: 2192-7111 Total Treatment Time:  61  Min.       Electronically signed by:Shannon PIÑA/DESIRAE WILHELM

## 2019-01-11 ENCOUNTER — APPOINTMENT (OUTPATIENT)
Dept: CT IMAGING | Facility: CLINIC | Age: 44
End: 2019-01-11
Payer: COMMERCIAL

## 2019-01-11 ENCOUNTER — HOSPITAL ENCOUNTER (EMERGENCY)
Facility: CLINIC | Age: 44
Discharge: HOME | End: 2019-01-11
Attending: SPECIALIST
Payer: COMMERCIAL

## 2019-01-11 VITALS
DIASTOLIC BLOOD PRESSURE: 72 MMHG | HEIGHT: 64 IN | RESPIRATION RATE: 16 BRPM | WEIGHT: 190.7 LBS | BODY MASS INDEX: 32.56 KG/M2 | SYSTOLIC BLOOD PRESSURE: 108 MMHG | OXYGEN SATURATION: 96 % | TEMPERATURE: 97.8 F | HEART RATE: 76 BPM

## 2019-01-11 DIAGNOSIS — S16.1XXA STRAIN OF NECK MUSCLE, INITIAL ENCOUNTER: Primary | ICD-10-CM
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
## 2019-01-11 DIAGNOSIS — S09.90XA CLOSED HEAD INJURY, INITIAL ENCOUNTER: ICD-10-CM

## 2019-01-11 PROCEDURE — 99284 EMERGENCY DEPT VISIT MOD MDM: CPT

## 2019-01-11 PROCEDURE — 72125 CT NECK SPINE W/O DYE: CPT

## 2019-01-11 PROCEDURE — 70450 CT HEAD/BRAIN W/O DYE: CPT

## 2019-01-11 PROCEDURE — 6370000000 HC RX 637 (ALT 250 FOR IP): Performed by: SPECIALIST

## 2019-01-11 RX ORDER — CYCLOBENZAPRINE HCL 10 MG
10 TABLET ORAL 3 TIMES DAILY PRN
Qty: 15 TABLET | Refills: 0 | Status: SHIPPED | OUTPATIENT
Start: 2019-01-11 | End: 2019-01-14 | Stop reason: SDUPTHER

## 2019-01-11 RX ORDER — IBUPROFEN 600 MG/1
600 TABLET ORAL EVERY 6 HOURS PRN
Qty: 20 TABLET | Refills: 0 | Status: SHIPPED | OUTPATIENT
Start: 2019-01-11 | End: 2019-01-14 | Stop reason: SDUPTHER

## 2019-01-11 RX ORDER — IBUPROFEN 600 MG/1
600 TABLET ORAL ONCE
Status: COMPLETED | OUTPATIENT
Start: 2019-01-11 | End: 2019-01-11

## 2019-01-11 RX ORDER — ACETAMINOPHEN 325 MG/1
650 TABLET ORAL ONCE
Status: COMPLETED | OUTPATIENT
Start: 2019-01-11 | End: 2019-01-11

## 2019-01-11 RX ORDER — IBUPROFEN 600 MG/1
600 TABLET ORAL EVERY 6 HOURS PRN
Qty: 30 TABLET | Refills: 0 | Status: SHIPPED | OUTPATIENT
Start: 2019-01-11 | End: 2019-05-29 | Stop reason: SDUPTHER

## 2019-01-11 RX ADMIN — ACETAMINOPHEN 650 MG: 325 TABLET ORAL at 13:44

## 2019-01-11 RX ADMIN — IBUPROFEN 600 MG: 600 TABLET ORAL at 13:45

## 2019-01-11 ASSESSMENT — ENCOUNTER SYMPTOMS
SHORTNESS OF BREATH: 0
ABDOMINAL PAIN: 0

## 2019-01-11 ASSESSMENT — PAIN SCALES - GENERAL
PAINLEVEL_OUTOF10: 6
PAINLEVEL_OUTOF10: 6

## 2019-01-11 ASSESSMENT — PAIN DESCRIPTION - LOCATION: LOCATION: NECK

## 2019-01-14 ENCOUNTER — OFFICE VISIT (OUTPATIENT)
Dept: FAMILY MEDICINE CLINIC | Age: 44
End: 2019-01-14
Payer: COMMERCIAL

## 2019-01-14 VITALS
TEMPERATURE: 98.2 F | DIASTOLIC BLOOD PRESSURE: 67 MMHG | HEART RATE: 92 BPM | BODY MASS INDEX: 33.77 KG/M2 | HEIGHT: 64 IN | SYSTOLIC BLOOD PRESSURE: 97 MMHG | WEIGHT: 197.8 LBS

## 2019-01-14 DIAGNOSIS — M54.50 ACUTE BILATERAL LOW BACK PAIN WITHOUT SCIATICA: Primary | ICD-10-CM

## 2019-01-14 PROCEDURE — 99211 OFF/OP EST MAY X REQ PHY/QHP: CPT | Performed by: FAMILY MEDICINE

## 2019-01-14 PROCEDURE — 99213 OFFICE O/P EST LOW 20 MIN: CPT | Performed by: FAMILY MEDICINE

## 2019-01-14 PROCEDURE — 1036F TOBACCO NON-USER: CPT | Performed by: FAMILY MEDICINE

## 2019-01-14 PROCEDURE — G8427 DOCREV CUR MEDS BY ELIG CLIN: HCPCS | Performed by: FAMILY MEDICINE

## 2019-01-14 PROCEDURE — G8417 CALC BMI ABV UP PARAM F/U: HCPCS | Performed by: FAMILY MEDICINE

## 2019-01-14 PROCEDURE — G8484 FLU IMMUNIZE NO ADMIN: HCPCS | Performed by: FAMILY MEDICINE

## 2019-01-14 RX ORDER — CYCLOBENZAPRINE HCL 10 MG
10 TABLET ORAL 3 TIMES DAILY PRN
Qty: 30 TABLET | Refills: 0 | Status: SHIPPED | OUTPATIENT
Start: 2019-01-14 | End: 2019-02-13 | Stop reason: SDUPTHER

## 2019-01-14 RX ORDER — IBUPROFEN 600 MG/1
600 TABLET ORAL EVERY 6 HOURS PRN
Qty: 30 TABLET | Refills: 0 | Status: SHIPPED | OUTPATIENT
Start: 2019-01-14 | End: 2019-02-13 | Stop reason: SDUPTHER

## 2019-01-14 ASSESSMENT — ENCOUNTER SYMPTOMS
SHORTNESS OF BREATH: 0
VOMITING: 0
ABDOMINAL PAIN: 0
NAUSEA: 0
BACK PAIN: 1
DIARRHEA: 0
BLOOD IN STOOL: 0

## 2019-01-18 ENCOUNTER — HOSPITAL ENCOUNTER (OUTPATIENT)
Dept: PHYSICAL THERAPY | Facility: CLINIC | Age: 44
Setting detail: THERAPIES SERIES
Discharge: HOME | End: 2019-01-18
Payer: COMMERCIAL

## 2019-01-18 PROCEDURE — G0283 ELEC STIM OTHER THAN WOUND: HCPCS

## 2019-01-18 PROCEDURE — 97110 THERAPEUTIC EXERCISES: CPT

## 2019-01-18 PROCEDURE — 97161 PT EVAL LOW COMPLEX 20 MIN: CPT

## 2019-01-29 ENCOUNTER — HOSPITAL ENCOUNTER (OUTPATIENT)
Dept: PHYSICAL THERAPY | Age: 44
Setting detail: THERAPIES SERIES
Discharge: HOME | End: 2019-01-29
Payer: COMMERCIAL

## 2019-01-29 ENCOUNTER — HOSPITAL ENCOUNTER (EMERGENCY)
Facility: CLINIC | Age: 44
Discharge: HOME | End: 2019-01-29
Attending: EMERGENCY MEDICINE
Payer: COMMERCIAL

## 2019-01-29 VITALS
DIASTOLIC BLOOD PRESSURE: 76 MMHG | HEART RATE: 102 BPM | OXYGEN SATURATION: 99 % | SYSTOLIC BLOOD PRESSURE: 111 MMHG | TEMPERATURE: 98 F | HEIGHT: 64 IN | BODY MASS INDEX: 32.44 KG/M2 | WEIGHT: 190 LBS | RESPIRATION RATE: 18 BRPM

## 2019-01-29 DIAGNOSIS — M54.42 CHRONIC LEFT-SIDED LOW BACK PAIN WITH LEFT-SIDED SCIATICA: ICD-10-CM

## 2019-01-29 DIAGNOSIS — G89.29 CHRONIC LEFT-SIDED LOW BACK PAIN WITH LEFT-SIDED SCIATICA: ICD-10-CM

## 2019-01-29 DIAGNOSIS — M54.32 SCIATICA OF LEFT SIDE: Primary | ICD-10-CM

## 2019-01-29 PROCEDURE — 6360000002 HC RX W HCPCS: Performed by: EMERGENCY MEDICINE

## 2019-01-29 PROCEDURE — 99283 EMERGENCY DEPT VISIT LOW MDM: CPT

## 2019-01-29 PROCEDURE — 6370000000 HC RX 637 (ALT 250 FOR IP): Performed by: EMERGENCY MEDICINE

## 2019-01-29 PROCEDURE — 96372 THER/PROPH/DIAG INJ SC/IM: CPT

## 2019-01-29 RX ORDER — PREDNISONE 20 MG/1
60 TABLET ORAL ONCE
Status: COMPLETED | OUTPATIENT
Start: 2019-01-29 | End: 2019-01-29

## 2019-01-29 RX ORDER — PREDNISONE 10 MG/1
TABLET ORAL
Qty: 20 TABLET | Refills: 0 | Status: SHIPPED | OUTPATIENT
Start: 2019-01-29 | End: 2019-02-08

## 2019-01-29 RX ORDER — KETOROLAC TROMETHAMINE 30 MG/ML
30 INJECTION, SOLUTION INTRAMUSCULAR; INTRAVENOUS ONCE
Status: COMPLETED | OUTPATIENT
Start: 2019-01-29 | End: 2019-01-29

## 2019-01-29 RX ADMIN — KETOROLAC TROMETHAMINE 30 MG: 30 INJECTION, SOLUTION INTRAMUSCULAR; INTRAVENOUS at 22:03

## 2019-01-29 RX ADMIN — PREDNISONE 60 MG: 20 TABLET ORAL at 22:03

## 2019-01-29 ASSESSMENT — PAIN DESCRIPTION - PROGRESSION: CLINICAL_PROGRESSION: GRADUALLY WORSENING

## 2019-01-29 ASSESSMENT — PAIN DESCRIPTION - PAIN TYPE: TYPE: CHRONIC PAIN

## 2019-01-29 ASSESSMENT — PAIN DESCRIPTION - LOCATION: LOCATION: ARM;LEG;BACK

## 2019-01-29 ASSESSMENT — PAIN DESCRIPTION - ORIENTATION: ORIENTATION: LEFT

## 2019-01-29 ASSESSMENT — PAIN SCALES - GENERAL
PAINLEVEL_OUTOF10: 9
PAINLEVEL_OUTOF10: 10
PAINLEVEL_OUTOF10: 9

## 2019-01-30 ASSESSMENT — ENCOUNTER SYMPTOMS
SHORTNESS OF BREATH: 0
COUGH: 0
ABDOMINAL PAIN: 0
BACK PAIN: 1
SORE THROAT: 0
DIARRHEA: 0
VOMITING: 0
NAUSEA: 0
RHINORRHEA: 0
EYE PAIN: 0

## 2019-01-31 ENCOUNTER — HOSPITAL ENCOUNTER (OUTPATIENT)
Dept: PHYSICAL THERAPY | Age: 44
Setting detail: THERAPIES SERIES
Discharge: HOME | End: 2019-01-31
Payer: COMMERCIAL

## 2019-02-05 ENCOUNTER — HOSPITAL ENCOUNTER (OUTPATIENT)
Dept: PHYSICAL THERAPY | Age: 44
Setting detail: THERAPIES SERIES
Discharge: HOME | End: 2019-02-05
Payer: COMMERCIAL

## 2019-02-05 PROCEDURE — 97110 THERAPEUTIC EXERCISES: CPT

## 2019-02-07 ENCOUNTER — HOSPITAL ENCOUNTER (OUTPATIENT)
Dept: PHYSICAL THERAPY | Age: 44
Setting detail: THERAPIES SERIES
Discharge: HOME | End: 2019-02-07
Payer: COMMERCIAL

## 2019-02-07 PROCEDURE — 97110 THERAPEUTIC EXERCISES: CPT

## 2019-02-12 ENCOUNTER — HOSPITAL ENCOUNTER (OUTPATIENT)
Dept: PHYSICAL THERAPY | Age: 44
Setting detail: THERAPIES SERIES
Discharge: HOME | End: 2019-02-12
Payer: COMMERCIAL

## 2019-02-12 PROCEDURE — 97110 THERAPEUTIC EXERCISES: CPT

## 2019-02-12 PROCEDURE — 97140 MANUAL THERAPY 1/> REGIONS: CPT

## 2019-02-13 DIAGNOSIS — M54.50 ACUTE BILATERAL LOW BACK PAIN WITHOUT SCIATICA: ICD-10-CM

## 2019-02-14 ENCOUNTER — HOSPITAL ENCOUNTER (OUTPATIENT)
Dept: PHYSICAL THERAPY | Age: 44
Setting detail: THERAPIES SERIES
Discharge: HOME | End: 2019-02-14
Payer: COMMERCIAL

## 2019-02-14 PROCEDURE — 97110 THERAPEUTIC EXERCISES: CPT

## 2019-02-14 RX ORDER — CYCLOBENZAPRINE HCL 10 MG
TABLET ORAL
Qty: 30 TABLET | Refills: 0 | Status: SHIPPED | OUTPATIENT
Start: 2019-02-14 | End: 2019-06-24 | Stop reason: ALTCHOICE

## 2019-02-14 RX ORDER — IBUPROFEN 600 MG/1
TABLET ORAL
Qty: 30 TABLET | Refills: 0 | Status: SHIPPED | OUTPATIENT
Start: 2019-02-14 | End: 2019-05-24 | Stop reason: SDUPTHER

## 2019-02-19 ENCOUNTER — HOSPITAL ENCOUNTER (OUTPATIENT)
Dept: PHYSICAL THERAPY | Age: 44
Setting detail: THERAPIES SERIES
Discharge: HOME | End: 2019-02-19
Payer: COMMERCIAL

## 2019-02-21 ENCOUNTER — HOSPITAL ENCOUNTER (OUTPATIENT)
Dept: PHYSICAL THERAPY | Age: 44
Setting detail: THERAPIES SERIES
Discharge: HOME | End: 2019-02-21
Payer: COMMERCIAL

## 2019-02-21 PROCEDURE — 97110 THERAPEUTIC EXERCISES: CPT

## 2019-02-21 PROCEDURE — 97140 MANUAL THERAPY 1/> REGIONS: CPT

## 2019-02-26 ENCOUNTER — HOSPITAL ENCOUNTER (OUTPATIENT)
Dept: PHYSICAL THERAPY | Age: 44
Setting detail: THERAPIES SERIES
Discharge: HOME | End: 2019-02-26
Payer: COMMERCIAL

## 2019-02-28 ENCOUNTER — APPOINTMENT (OUTPATIENT)
Dept: PHYSICAL THERAPY | Age: 44
End: 2019-02-28
Payer: COMMERCIAL

## 2019-02-28 ENCOUNTER — HOSPITAL ENCOUNTER (OUTPATIENT)
Dept: PHYSICAL THERAPY | Age: 44
Setting detail: THERAPIES SERIES
Discharge: HOME | End: 2019-02-28
Payer: COMMERCIAL

## 2019-03-05 ENCOUNTER — HOSPITAL ENCOUNTER (OUTPATIENT)
Dept: PHYSICAL THERAPY | Age: 44
Setting detail: THERAPIES SERIES
Discharge: HOME | End: 2019-03-05
Payer: COMMERCIAL

## 2019-03-13 ENCOUNTER — OFFICE VISIT (OUTPATIENT)
Dept: FAMILY MEDICINE CLINIC | Age: 44
End: 2019-03-13
Payer: COMMERCIAL

## 2019-03-13 VITALS
HEART RATE: 97 BPM | SYSTOLIC BLOOD PRESSURE: 116 MMHG | DIASTOLIC BLOOD PRESSURE: 75 MMHG | WEIGHT: 199.2 LBS | TEMPERATURE: 97.6 F | BODY MASS INDEX: 34.01 KG/M2 | HEIGHT: 64 IN

## 2019-03-13 DIAGNOSIS — Z11.4 ENCOUNTER FOR SCREENING FOR HIV: ICD-10-CM

## 2019-03-13 DIAGNOSIS — M54.50 ACUTE BILATERAL LOW BACK PAIN WITHOUT SCIATICA: Primary | ICD-10-CM

## 2019-03-13 PROCEDURE — 1036F TOBACCO NON-USER: CPT | Performed by: FAMILY MEDICINE

## 2019-03-13 PROCEDURE — 99211 OFF/OP EST MAY X REQ PHY/QHP: CPT | Performed by: FAMILY MEDICINE

## 2019-03-13 PROCEDURE — G8484 FLU IMMUNIZE NO ADMIN: HCPCS | Performed by: FAMILY MEDICINE

## 2019-03-13 PROCEDURE — G8427 DOCREV CUR MEDS BY ELIG CLIN: HCPCS | Performed by: FAMILY MEDICINE

## 2019-03-13 PROCEDURE — 99213 OFFICE O/P EST LOW 20 MIN: CPT | Performed by: FAMILY MEDICINE

## 2019-03-13 PROCEDURE — G8417 CALC BMI ABV UP PARAM F/U: HCPCS | Performed by: FAMILY MEDICINE

## 2019-03-13 ASSESSMENT — ENCOUNTER SYMPTOMS
NAUSEA: 0
ABDOMINAL PAIN: 0
CONSTIPATION: 0
VOMITING: 0
SHORTNESS OF BREATH: 0
COUGH: 0
SORE THROAT: 0
DIARRHEA: 0
BACK PAIN: 1

## 2019-03-13 ASSESSMENT — PATIENT HEALTH QUESTIONNAIRE - PHQ9
SUM OF ALL RESPONSES TO PHQ9 QUESTIONS 1 & 2: 0
1. LITTLE INTEREST OR PLEASURE IN DOING THINGS: 0
SUM OF ALL RESPONSES TO PHQ QUESTIONS 1-9: 0
SUM OF ALL RESPONSES TO PHQ QUESTIONS 1-9: 0
2. FEELING DOWN, DEPRESSED OR HOPELESS: 0

## 2019-03-14 ENCOUNTER — HOSPITAL ENCOUNTER (EMERGENCY)
Facility: CLINIC | Age: 44
Discharge: HOME | End: 2019-03-14
Attending: EMERGENCY MEDICINE
Payer: COMMERCIAL

## 2019-03-14 VITALS
TEMPERATURE: 97.9 F | SYSTOLIC BLOOD PRESSURE: 103 MMHG | HEART RATE: 90 BPM | OXYGEN SATURATION: 98 % | HEIGHT: 64 IN | BODY MASS INDEX: 34.19 KG/M2 | DIASTOLIC BLOOD PRESSURE: 76 MMHG | RESPIRATION RATE: 16 BRPM

## 2019-03-14 DIAGNOSIS — J02.9 ACUTE PHARYNGITIS, UNSPECIFIED ETIOLOGY: Primary | ICD-10-CM

## 2019-03-14 DIAGNOSIS — H92.02 OTALGIA OF LEFT EAR: ICD-10-CM

## 2019-03-14 PROCEDURE — 99283 EMERGENCY DEPT VISIT LOW MDM: CPT

## 2019-03-14 RX ORDER — PREDNISONE 50 MG/1
50 TABLET ORAL DAILY
Qty: 4 TABLET | Refills: 0 | Status: SHIPPED | OUTPATIENT
Start: 2019-03-14 | End: 2019-03-18

## 2019-03-14 RX ORDER — AZITHROMYCIN 250 MG/1
TABLET, FILM COATED ORAL
Qty: 1 PACKET | Refills: 0 | Status: SHIPPED | OUTPATIENT
Start: 2019-03-14 | End: 2019-06-24 | Stop reason: ALTCHOICE

## 2019-03-14 ASSESSMENT — PAIN DESCRIPTION - PAIN TYPE: TYPE: ACUTE PAIN

## 2019-03-14 ASSESSMENT — ENCOUNTER SYMPTOMS
VOMITING: 0
COUGH: 1

## 2019-03-14 ASSESSMENT — PAIN DESCRIPTION - DESCRIPTORS: DESCRIPTORS: ACHING

## 2019-03-14 ASSESSMENT — PAIN SCALES - GENERAL: PAINLEVEL_OUTOF10: 7

## 2019-03-14 ASSESSMENT — PAIN DESCRIPTION - LOCATION: LOCATION: GENERALIZED

## 2019-03-15 ENCOUNTER — CARE COORDINATION (OUTPATIENT)
Dept: CARE COORDINATION | Age: 44
End: 2019-03-15

## 2019-03-28 ENCOUNTER — HOSPITAL ENCOUNTER (OUTPATIENT)
Dept: PHYSICAL THERAPY | Age: 44
Setting detail: THERAPIES SERIES
Discharge: HOME | End: 2019-03-28
Payer: COMMERCIAL

## 2019-03-28 PROCEDURE — 97110 THERAPEUTIC EXERCISES: CPT

## 2019-03-28 PROCEDURE — 97162 PT EVAL MOD COMPLEX 30 MIN: CPT

## 2019-03-28 ASSESSMENT — PAIN DESCRIPTION - LOCATION: LOCATION: BACK

## 2019-03-28 ASSESSMENT — PAIN SCALES - GENERAL: PAINLEVEL_OUTOF10: 5

## 2019-03-28 ASSESSMENT — PAIN - FUNCTIONAL ASSESSMENT: PAIN_FUNCTIONAL_ASSESSMENT: PREVENTS OR INTERFERES WITH MANY ACTIVE NOT PASSIVE ACTIVITIES

## 2019-03-28 ASSESSMENT — PAIN DESCRIPTION - FREQUENCY: FREQUENCY: CONTINUOUS

## 2019-03-28 ASSESSMENT — PAIN DESCRIPTION - DESCRIPTORS: DESCRIPTORS: ACHING;DISCOMFORT

## 2019-03-28 ASSESSMENT — PAIN DESCRIPTION - ORIENTATION: ORIENTATION: LOWER;MID

## 2019-03-28 ASSESSMENT — PAIN DESCRIPTION - PAIN TYPE: TYPE: ACUTE PAIN

## 2019-03-28 ASSESSMENT — PAIN DESCRIPTION - PROGRESSION: CLINICAL_PROGRESSION: NOT CHANGED

## 2019-03-28 ASSESSMENT — PAIN DESCRIPTION - DIRECTION: RADIATING_TOWARDS: LEFT UPPER THIGH

## 2019-03-28 ASSESSMENT — PAIN DESCRIPTION - ONSET: ONSET: SUDDEN

## 2019-04-01 ENCOUNTER — HOSPITAL ENCOUNTER (OUTPATIENT)
Dept: PHYSICAL THERAPY | Age: 44
Setting detail: THERAPIES SERIES
Discharge: HOME | End: 2019-04-01
Payer: COMMERCIAL

## 2019-04-01 PROCEDURE — 97113 AQUATIC THERAPY/EXERCISES: CPT

## 2019-04-01 ASSESSMENT — PAIN DESCRIPTION - PAIN TYPE: TYPE: ACUTE PAIN

## 2019-04-01 ASSESSMENT — PAIN SCALES - GENERAL: PAINLEVEL_OUTOF10: 7

## 2019-04-01 ASSESSMENT — PAIN DESCRIPTION - LOCATION: LOCATION: BACK

## 2019-04-01 ASSESSMENT — PAIN DESCRIPTION - ORIENTATION: ORIENTATION: UPPER;MID;LOWER;LEFT

## 2019-04-01 NOTE — PROGRESS NOTES
[facility] [place]   Outpatient Physical Therapy  [address] [facility]. [address]  Phone: [phone]  Fax: [phone]  Daily Progress Note    Date: 19    Patient Name: Gilbert Parham        MRN: [id number]  Account: [de-identified] : 1975      General Information:  Additional Pertinent Hx: CVA with right side weakness   Referring Practitioner: Carson Talavera MD   Referral Date : 19  Diagnosis: Lower back pain  Onset Date: 19  PT Insurance Information: [place]   Total # of Visits Approved: 12  Total # of Visits to Date: 2  Plan of Care/Certification Expiration Date: 19  No Show: 0  Progress Note Due Date: 19  Canceled Appointment: 0    Subjective:  Subjective: Patient reports she walks to PT- when asked if she lives close she says not at all- attempted to offer assistance with transportation- reports she has cab services next visit. All pain is on L side of body from upper back down to lower back     Pain:  Patient Currently in Pain: Yes  Pain Assessment: 0-10  Pain Level: 7  Pain Type: Acute pain  Pain Location: Back  Pain Orientation: Upper;Mid;Lower; Left  Pain Radiating Towards: L buttock and lateral hip       Objective:  [address]  Services Exercise Log  Aquatic, Hip & DLS Program- Phase 1    Date of Eval:19                                Primary PT:Devon   Diagnosis: Lower Back Pain   Things to Focus On (goals): Lesson pain   Surgical Precautions:  Medical Precautions: Hx CVA 3 years ago - R sided weakness/R UE spastic; some speech issues  [] C-9 dates  [] Occ Med   [] Medicare   **fearful of water**  Date 19       Visit # 2/12       Walk F/L/R 2 Laps @ Rail       Marching 10x       Squats 10x5\"       Step-Ups F/L        Heel-toe raises 10x       SLR F/L/R 10x       Leg Curls 10x       Knee/Flex/Ext        F/L Lunges        Kickboard Ex.  Small       Iso Abd. 5x5\" L hand on top of R Hand       Push-pull        Paddling                UE Format                        Stretches  Add      Achllies        Hamstring 2x20\"               Cool Down 2 Laps @ Rail       Pain Rating 7         Comment:  Comments: Patient 13' late; reviewed attendence and scheduling policy. Initiated aquatic therapy with emphasis on core stability and postural awareness; educated patient on pelvic neutral and benefits of aquatics    Assessment: Body structures, Functions, Activity limitations: Decreased functional mobility ; Decreased ADL status; Decreased ROM; Decreased strength;Decreased high-level IADLs; Increased Pain  Treatment Diagnosis: lower back pain   Prognosis: Fair  REQUIRES PT FOLLOW UP: Yes  Activity Tolerance: Patient Tolerated treatment well  Comments: Unable to tolerate walking backward; fearful of water. R sided weakness from previous CVA limits progress- UE weakness > LE weakness. R UE spasticity with use.  Emphasis on technique throughout program    Plan:  Plan: Continue with current plan(Assess response and add stretches)    Therapy Time:  Time In: 1815  Time Out: [address]  Minutes: 25  Timed Code Treatment Minutes: 25 Minutes    Treatment Charges: Minutes Units   []  Ultrasound     []  Electrical-Stim     []  Iontophoresis     []  Traction     []  Massage       []  Eval     []  Gait     []  Vasopneumatic Device     []  Ther Exercise       []  Manual Therapy       []  Ther Activities       [x]  Aquatics 25 2   []  Neuro Re-Ed       []  Other       Total Treatment Time: 25 2       Corrinne Key, PTA

## 2019-04-03 ENCOUNTER — HOSPITAL ENCOUNTER (OUTPATIENT)
Dept: PHYSICAL THERAPY | Age: 44
Setting detail: THERAPIES SERIES
Discharge: HOME | End: 2019-04-03
Payer: COMMERCIAL

## 2019-04-03 NOTE — PROGRESS NOTES
Physical Therapy  [address]   Outpatient Physical Therapy  Cancel/No Show Note    Date: 4/3/19    Patient Name: Nikolay Flynn        MRN: [id number]  Account: [de-identified] : 1975      General Information:  Referring Practitioner: Ronna Rogers MD   Referral Date : 19  Diagnosis: Lower back pain  Onset Date: 19  PT Insurance Information: [place]   Total # of Visits Approved: 12  Total # of Visits to Date: 2  Plan of Care/Certification Expiration Date: 19  No Show: 0  Progress Note Due Date: 19  Canceled Appointment: 1        Comments: Patient canceled aquatics session for today's date per the - transportation did not show up.   Patient has future appointments scheduled    Electronically signed by Margo Hanna PTA on 4/3/2019 at 6:31 PM

## 2019-04-09 ENCOUNTER — HOSPITAL ENCOUNTER (OUTPATIENT)
Dept: PHYSICAL THERAPY | Age: 44
Setting detail: THERAPIES SERIES
Discharge: HOME | End: 2019-04-09
Payer: COMMERCIAL

## 2019-04-09 PROCEDURE — 97113 AQUATIC THERAPY/EXERCISES: CPT

## 2019-04-09 ASSESSMENT — PAIN DESCRIPTION - LOCATION: LOCATION: BACK

## 2019-04-09 ASSESSMENT — PAIN DESCRIPTION - PAIN TYPE: TYPE: ACUTE PAIN

## 2019-04-09 ASSESSMENT — PAIN SCALES - GENERAL: PAINLEVEL_OUTOF10: 7

## 2019-04-09 ASSESSMENT — PAIN DESCRIPTION - ORIENTATION: ORIENTATION: UPPER;MID;LOWER;LEFT

## 2019-04-09 NOTE — PROGRESS NOTES
[facility]   Outpatient Physical Therapy  [address] [place] [address]. [address]  Phone: [phone]  Fax: [phone]  Daily Progress Note    Date: 19    Patient Name: Alex Franco        MRN: [id number]  Account: [de-identified] : 1975      General Information:  Additional Pertinent Hx: CVA with right side weakness   Referring Practitioner: Juan Manuel Lopez MD   Referral Date : 19  Diagnosis: Lower back pain  Onset Date: 19  PT Insurance Information: Katherine Oliveira   Total # of Visits Approved: 12  Total # of Visits to Date: 3  Plan of Care/Certification Expiration Date: 19  No Show: 0  Progress Note Due Date: 19  Canceled Appointment: 1    Subjective:  Subjective: Increased soreness all over after initial visit. Reports L side of back continues with pain     Pain:  Patient Currently in Pain: Yes  Pain Assessment: 0-10  Pain Level: 7  Pain Type: Acute pain  Pain Location: Back  Pain Orientation: Upper;Mid;Lower; Left  Pain Radiating Towards: L buttock and hip       Objective:  [address]  Services Exercise Log  Aquatic, Hip & DLS Program- Phase 1    Date of Eval:19                                Primary PT:Devon   Diagnosis: Lower Back Pain   Things to Focus On (goals): Lesson pain   Surgical Precautions:  Medical Precautions: Hx CVA 3 years ago - R sided weakness/R UE spastic; some speech issues  [] C-9 dates  [] Occ Med   [] Medicare   **fearful of water**  Date 19      Visit # 2/12 3/12      Walk F/L/R 2 Laps @ Rail 2 Laps @ Rail      Marching 10x 10x      Squats 10x5\" 10x5\"      Step-Ups F/L        Heel-toe raises 10x 10x      SLR F/L/R 10x 10x      Leg Curls 10x 10x      Knee/Flex/Ext  10x      F/L Lunges        Kickboard Ex.  Small Small      Iso Abd. 5x5\" L hand on top of R Hand 5x5\" L Hand on R Hand      Push-pull        Paddling                UE Format                        Stretches        Achllies  2x20\"      Hamstring 2x20\" 2x20\"      Lunge @ Step  2x20\"              Cool Down 2 Laps @ Rail 2 Laps @ Rail      Pain Rating 7 7          Assessment: Body structures, Functions, Activity limitations: Decreased functional mobility ; Decreased ADL status; Decreased ROM; Decreased strength;Decreased high-level IADLs; Increased Pain  Treatment Diagnosis: lower back pain   Prognosis: Fair  REQUIRES PT FOLLOW UP: Yes  Activity Tolerance: Patient Tolerated treatment well  Comments: Improved tolerance this date; continues to require cues for technique- compensation noted with gait due to R hemiplegia. Added hip /knee flex/ext and added stretches for LE's.  Patient noted soreness in lower back and fatigue after session    Plan:  Plan: Continue with current plan(Increase reps to tolerance)    Therapy Time:  Time In: 1430  Time Out: 1504  Minutes: 34  Timed Code Treatment Minutes: 30 Minutes    Treatment Charges: Minutes Units   []  Ultrasound     []  Electrical-Stim     []  Iontophoresis     []  Traction     []  Massage       []  Eval     []  Gait     []  Vasopneumatic Device     []  Ther Exercise       []  Manual Therapy       []  Ther Activities       [x]  Aquatics 30 2   []  Neuro Re-Ed       []  Other       Total Treatment Time: 30 2       Kitty Bustillo, PTA

## 2019-04-11 ENCOUNTER — HOSPITAL ENCOUNTER (OUTPATIENT)
Dept: PHYSICAL THERAPY | Age: 44
Setting detail: THERAPIES SERIES
Discharge: HOME | End: 2019-04-11
Payer: COMMERCIAL

## 2019-04-11 PROCEDURE — 97113 AQUATIC THERAPY/EXERCISES: CPT

## 2019-04-16 ENCOUNTER — HOSPITAL ENCOUNTER (OUTPATIENT)
Dept: PHYSICAL THERAPY | Age: 44
Setting detail: THERAPIES SERIES
Discharge: HOME | End: 2019-04-16
Payer: COMMERCIAL

## 2019-04-16 PROCEDURE — 97113 AQUATIC THERAPY/EXERCISES: CPT

## 2019-04-18 ENCOUNTER — HOSPITAL ENCOUNTER (OUTPATIENT)
Dept: PHYSICAL THERAPY | Age: 44
Setting detail: THERAPIES SERIES
Discharge: HOME | End: 2019-04-18
Payer: COMMERCIAL

## 2019-04-18 PROCEDURE — 97113 AQUATIC THERAPY/EXERCISES: CPT

## 2019-04-18 NOTE — PROGRESS NOTES
noodle 5 min  2'  5'     Cycling     1'     Jacks         X-Country                       UE Format                                   Stretches           Achllies held today 2x20\"   2x20\"     Hamstring \"   2x20\"  2x20\"     Lunge @ Step  \"                     Cool Down 1 lap   2 Laps  2 Laps     Pain Rating  5 (inc to 9)  4 4            Assessment: Body structures, Functions, Activity limitations: Decreased functional mobility ; Decreased ADL status; Decreased ROM; Decreased strength;Decreased high-level IADLs; Increased Pain  Treatment Diagnosis: lower back pain   Prognosis: Fair  REQUIRES PT FOLLOW UP: Yes  Activity Tolerance: Patient tolerated treatment well. Increased reps to tolerance and added deep water aerobic exercise to tolerance- slightly fearful in deep water but overall improved tolerance   Comments: Emphasis on technique throughout program- encouraged upright posture and core stability with avoidance of increasing pain.  Patient arrives with improved gait; improved mobility with R LE     Plan:  Plan: Continue with current plan(Add step ups)     Therapy Time:  Time In: 254 PM  Time Out: 329  PM  Minutes: 35  Timed Code Treatment Minutes:  30 Minutes     Treatment Charges: Minutes Units   []  Ultrasound       []  Electrical-Stim       []  Iontophoresis       []  Traction       []  Massage       []  Eval       []  Gait       []  Vasopneumatic Device       []  Ther Exercise       []  Manual Therapy       []  Ther Activities       [x]  Aquatics 30 2   []  Neuro Re-Ed       []  Other       Total Treatment Time: 30 2         Gabe Chavez PTA

## 2019-04-23 ENCOUNTER — HOSPITAL ENCOUNTER (OUTPATIENT)
Dept: PHYSICAL THERAPY | Age: 44
Setting detail: THERAPIES SERIES
Discharge: HOME | End: 2019-04-23
Payer: COMMERCIAL

## 2019-04-23 PROCEDURE — 97113 AQUATIC THERAPY/EXERCISES: CPT

## 2019-04-23 NOTE — PROGRESS NOTES
[address]   Outpatient Physical Therapy  [address] [facility]. [address]  Phone: [phone]  Fax: [phone]  Daily Progress Note     Date: 4/23/19    Patient Maurizio Mao  UZT:  [id number]  EDFPBAZ: [de-identified]     General Information:  Additional Pertinent Hx: CVA with right side weakness   Referring Practitioner: Duncan Cotter MD   Referral Date : 03/13/19  Diagnosis: Lower back pain  Onset Date: 01/11/19  PT Insurance Information: [place]   Total # of Visits Approved: 12  Total # of Visits to Date: 7  Plan of Care/Certification Expiration Date: 05/09/19  No Show: 0  Progress Note Due Date: 04/18/19  Canceled Appointment: 1     Subjective:  Subjective: Denies any issues after last visit. Feels she is tolerating aquatics better; notes some improvement     Pain:   Patient Currently in Pain: Yes  Pain Assessment: 0-10  Pain Level: 5  Pain Type: Acute pain  Pain Location: Back  Pain Orientation: Upper;Mid;Lower; Left   Pain Radiating Towards: From L Shoulder down flank; L buttock; Denies LE symptoms     Objective:  [facility] Exercise Log  Aquatic, Hip & DLS Program- Phase 1     Date of Eval:03-28-19                                Primary PT:Devon   Diagnosis: Lower Back Pain   Things to Focus On (goals): Lesson pain   Surgical Precautions:  Medical Precautions: Hx CVA 3 years ago - R sided weakness/R UE spastic; mild expressive aphasia  [] C-9 dates  [] Occ Med   [] Medicare   **fearful of water**  Date 4/11/19 4/16/19 4/18/19 4/23/18      Visit # 4/12 5/12 6/12 7/12     Walk F/L/R 2 laps @ rail   2 Laps +R  2 Laps  2 Laps     Marching 10x   2 Laps  2 Laps  2 Laps     Squats 10x5\"  10x5\"  12x5\"   12x5\"     Step-Ups F/L       Low 10x +L   Heel-toe raises 10x   10x  12x  12x     SLR F/L/R  10x  10x  12x  12x     Leg Curls 10x  10x   12x  12x     Knee/Flex/Ext  10x  10x  12x  12x     F/L Lunges             Kickboard Ex.  Held today  Small   Small Small      Iso Abd.  \"  5x5\"  7x5\"  10x5\"     Push-pull             Paddling                           Deep Water   1 Noodle 1 Noodle  1 Noodle      Hang  noodle 5 min  2'  5'  5'     Cycling     1'  1'     Jacks              X-Country                           UE Format                                         Stretches             Achllies held today 2x20\"   2x20\" 2x20\"      Hamstring \"   2x20\"  2x20\"  2x20\"     Lunge @ Step  \"                         Cool Down 1 lap   2 Laps  2 Laps  2 Laps     Pain Rating  5 (inc to 9)  4 4   5           Assessment: Body structures, Functions, Activity limitations: Decreased functional mobility ; Decreased ADL status; Decreased ROM; Decreased strength;Decreased high-level IADLs; Increased Pain  Treatment Diagnosis: lower back pain   Prognosis: Fair  REQUIRES PT FOLLOW UP: Yes  Activity Tolerance: Patient tolerated treatment well. Increased reps to tolerance and added deep water aerobic exercise to tolerance- slightly fearful in deep water but overall improved tolerance   Comments: Emphasis on technique throughout program- encouraged upright posture and core stability with avoidance of increasing pain.  Patient arrives with improved gait; improved mobility with R LE     Plan:  Plan: Continue with current plan(Add lateral step ups)     Therapy Time:  Time In: 250 PM  Time Out:  330 PM  Minutes: 40  Timed Code Treatment Minutes:  35 Minutes     Treatment Charges: Minutes Units   []  Ultrasound       []  Electrical-Stim       []  Iontophoresis       []  Traction       []  Massage       []  Eval       []  Gait       []  Vasopneumatic Device       []  Ther Exercise       []  Manual Therapy       []  Ther Activities       [x]  Aquatics 35 2   []  Neuro Re-Ed       []  Other       Total Treatment Time: 30 2         Diamante Knight PTA

## 2019-04-25 ENCOUNTER — HOSPITAL ENCOUNTER (OUTPATIENT)
Dept: PHYSICAL THERAPY | Age: 44
Setting detail: THERAPIES SERIES
Discharge: HOME | End: 2019-04-25
Payer: COMMERCIAL

## 2019-04-25 PROCEDURE — 97113 AQUATIC THERAPY/EXERCISES: CPT

## 2019-04-25 NOTE — PROGRESS NOTES
10x5\" 10x5\"    Push-pull             Paddling                           Deep Water   1 Noodle 1 Noodle  1 Noodle   1 Noodle   Hang  noodle 5 min  2'  5'  5' Declined today   Cycling     1'  1' Declined today   Virikaylen Curtis              X-Country                           UE Format                                         Stretches             Achllies held today 2x20\"   2x20\" 2x20\"  2x20\"    Hamstring \"   2x20\"  2x20\"  2x20\"  2x20\"   Lunge @ Step  \"                         Cool Down 1 lap   2 Laps  2 Laps  2 Laps  2 laps   Pain Rating  5 (inc to 9)  4 4   5 0        Comment:  Comments: Patient anxious about deep water exercises   Assessment: Body structures, Functions, Activity limitations: Decreased functional mobility ; Decreased ADL status; Decreased ROM; Decreased strength;Decreased high-level IADLs; Increased Pain  Treatment Diagnosis: lower back pain   Prognosis: Fair  REQUIRES PT FOLLOW UP: Yes  Activity Tolerance: Patient tolerated treatment well     Fearful of deep water    Comments: Continued to Emphasis technique throughout program- encouraged upright posture and core stability with avoidance of increasing pain.     Plan:  Plan: Continue with current plan       Therapy Time:  Time In: 1425  Time Out: 1505  Minutes: 40  Timed Code Treatment Minutes: 30 Minutes(Concurrent mintues 5272-2424)    Treatment Charges: Minutes Units   []  Ultrasound     []  Electrical-Stim     []  Iontophoresis     []  Traction     []  Massage       []  Eval     []  Gait     []  Vasopneumatic Device     []  Ther Exercise       []  Manual Therapy       []  Ther Activities       []  Aquatics 30 2   []  Neuro Re-Ed       []  Other       Total Treatment Time: 30 2       Elan Hsieh PTA

## 2019-05-02 ENCOUNTER — HOSPITAL ENCOUNTER (OUTPATIENT)
Dept: PHYSICAL THERAPY | Age: 44
Setting detail: THERAPIES SERIES
Discharge: HOME | End: 2019-05-02
Payer: COMMERCIAL

## 2019-05-02 NOTE — PROGRESS NOTES
Physical Therapy  Discharge Note  Date: 2019  Patient Name: Debbie Rawls  MRN: [id number]     :   1975    General  Additional Pertinent Hx: CVA with right side weakness   Referring Practitioner: Jessica Azar MD   PT Visit Information  Onset Date: 19  PT Insurance Information: [place]   Total # of Visits Approved: 12  Plan of Care/Certification Expiration Date: 19  No Show: 1  Canceled Appointment: 1  General Comment  Comments: No show for re-evaluation today.  (Discharge )          Carmella Zambrano, PT

## 2019-05-24 DIAGNOSIS — M54.50 ACUTE BILATERAL LOW BACK PAIN WITHOUT SCIATICA: ICD-10-CM

## 2019-05-29 RX ORDER — IBUPROFEN 600 MG/1
TABLET ORAL
Qty: 30 TABLET | Refills: 0 | Status: SHIPPED | OUTPATIENT
Start: 2019-05-29 | End: 2019-06-24 | Stop reason: ALTCHOICE

## 2019-06-17 ENCOUNTER — OFFICE VISIT (OUTPATIENT)
Dept: FAMILY MEDICINE CLINIC | Age: 44
End: 2019-06-17
Payer: COMMERCIAL

## 2019-06-17 VITALS
WEIGHT: 203.2 LBS | HEART RATE: 81 BPM | TEMPERATURE: 96.9 F | BODY MASS INDEX: 34.88 KG/M2 | DIASTOLIC BLOOD PRESSURE: 60 MMHG | SYSTOLIC BLOOD PRESSURE: 84 MMHG

## 2019-06-17 DIAGNOSIS — R53.83 FATIGUE, UNSPECIFIED TYPE: Primary | ICD-10-CM

## 2019-06-17 PROCEDURE — 1036F TOBACCO NON-USER: CPT | Performed by: STUDENT IN AN ORGANIZED HEALTH CARE EDUCATION/TRAINING PROGRAM

## 2019-06-17 PROCEDURE — 99211 OFF/OP EST MAY X REQ PHY/QHP: CPT | Performed by: STUDENT IN AN ORGANIZED HEALTH CARE EDUCATION/TRAINING PROGRAM

## 2019-06-17 PROCEDURE — G8427 DOCREV CUR MEDS BY ELIG CLIN: HCPCS | Performed by: STUDENT IN AN ORGANIZED HEALTH CARE EDUCATION/TRAINING PROGRAM

## 2019-06-17 PROCEDURE — G8417 CALC BMI ABV UP PARAM F/U: HCPCS | Performed by: STUDENT IN AN ORGANIZED HEALTH CARE EDUCATION/TRAINING PROGRAM

## 2019-06-17 PROCEDURE — 99213 OFFICE O/P EST LOW 20 MIN: CPT | Performed by: STUDENT IN AN ORGANIZED HEALTH CARE EDUCATION/TRAINING PROGRAM

## 2019-06-17 ASSESSMENT — ENCOUNTER SYMPTOMS
ABDOMINAL DISTENTION: 0
SHORTNESS OF BREATH: 0
BLOOD IN STOOL: 0
CHEST TIGHTNESS: 0
ABDOMINAL PAIN: 1
NAUSEA: 0
ANAL BLEEDING: 0
VOMITING: 0
COLOR CHANGE: 0
DIARRHEA: 0
CONSTIPATION: 0

## 2019-06-17 ASSESSMENT — PATIENT HEALTH QUESTIONNAIRE - PHQ9
2. FEELING DOWN, DEPRESSED OR HOPELESS: 0
SUM OF ALL RESPONSES TO PHQ QUESTIONS 1-9: 0
SUM OF ALL RESPONSES TO PHQ9 QUESTIONS 1 & 2: 0
1. LITTLE INTEREST OR PLEASURE IN DOING THINGS: 0
SUM OF ALL RESPONSES TO PHQ QUESTIONS 1-9: 0

## 2019-06-17 NOTE — PROGRESS NOTES
96.9 °F (36.1 °C) (Temporal)   Wt 203 lb 3.2 oz (92.2 kg)   BMI 34.88 kg/m²    BP Readings from Last 3 Encounters:   06/17/19 84/60   03/14/19 103/76   03/13/19 116/75       Physical Exam   Constitutional: She is oriented to person, place, and time. She appears well-developed and well-nourished. HENT:   Head: Normocephalic and atraumatic. Cardiovascular: Normal rate and regular rhythm. Pulmonary/Chest: Effort normal and breath sounds normal. No respiratory distress. Abdominal: Soft. Bowel sounds are normal. She exhibits no distension and no mass. There is no tenderness. There is no rebound and no guarding. No hernia. Neurological: She is alert and oriented to person, place, and time. Skin: Skin is warm and dry. Nursing note and vitals reviewed. Lab Results   Component Value Date    WBC 5.0 02/10/2012    HGB 11.6 (L) 02/10/2012    HCT 35.3 (L) 02/10/2012     02/10/2012    CHOL 124 08/06/2018    TRIG 83 08/06/2018    HDL 42 08/06/2018     02/10/2012    K 4.2 02/10/2012     (H) 02/10/2012    CREATININE 0.63 02/10/2012    BUN 8 02/10/2012    CO2 21 02/10/2012    TSH 1.62 02/10/2012    LABA1C 5.1 08/06/2018     Lab Results   Component Value Date    CALCIUM 9.4 02/10/2012     Lab Results   Component Value Date    LDLCHOLESTEROL 65 08/06/2018       Assessment and Plan:    1. Fatigue, unspecified type  - CBC; Future  - Comprehensive Metabolic Panel; Future  - TSH; Future          Requested Prescriptions      No prescriptions requested or ordered in this encounter       There are no discontinued medications. Baldo Hong received counseling on the following healthy behaviors: nutrition, exercise and medication adherence    Discussed use,benefit, and side effects of prescribed medications. Barriers to medication compliance addressed. All patient questions answered. Pt voiced understanding. Return in about 1 week (around 6/24/2019) for follow up fatigue.         Disclaimer: Some orall of this note was transcribed using voice-recognition software. This may cause typographical errors occasionally. Although all effort is made to fix these errors, please do not hesitate to contact our office if there Kathi Alejo concern with the understanding of this note.

## 2019-06-17 NOTE — PATIENT INSTRUCTIONS
Visit Information    Have you changed or started any medications since your last visit including any over-the-counter medicines, vitamins, or herbal medicines? no   Have you stopped taking any of your medications? Is so, why? -  no  Are you having any side effects from any of your medications? - no    Have you seen any other physician or provider since your last visit?  no   Have you had any other diagnostic tests since your last visit?  no   Have you been seen in the emergency room and/or had an admission in a hospital since we last saw you?  no   Have you had your routine dental cleaning in the past 6 months?  no     Do you have an active MyChart account? If no, what is the barrier? No:     Patient Care Team:  Silvino Martinez MD as PCP - General (Family Medicine)  Jaspal Gutierrez DPM as Physician (Podiatry)    Medical History Review  Past Medical, Family, and Social History reviewed and does not contribute to the patient presenting condition    Health Maintenance   Topic Date Due    HIV screen  04/14/1990    Flu vaccine (Season Ended) 02/25/2020 (Originally 9/1/2019)    DTaP/Tdap/Td vaccine (1 - Tdap) 03/13/2020 (Originally 4/14/1994)    Cervical cancer screen  03/13/2020 (Originally 4/14/1996)    Diabetes screen  08/06/2021    Lipid screen  08/06/2023    Pneumococcal 0-64 years Vaccine  Aged Out         Thank you for letting us take care of you today. We hope all your questions were addressed. If a question was overlooked or something else comes to mind after you return home, please contact a member of your Care Team listed below. Please make sure you have a routine office visit set up to follow-up on [address].      Your Care Team at [place] [address] is Team #3  Radha Perkins MD (Faculty)  Jamie Snell MD (Faculty  Gerrymarichuy Dee MD (Resident)  Nikolas Clark MD (Resident)   Will Chowdhury MD (Resident)  Cory Cosme MD (Resident)  Samantha Davies MD (Resident)  Soraida Stoner SVITLANA Javier, AVA Mae., [facility])  Silas Camilo [facility] office)  Lesley Barba ([address] [facility])  Catrachito Banks, [place] ([address] [facility])  Ron Cabral, Ph.D., (Behavioral Services)  David Cliffordprimo, [address] [place] [address] (Clinical Pharmacist)     Office phone number: [phone]    If you need to get in right away due to illness, please be advised we have \"Same Day\" appointments available Monday-Friday. Please call us at [phone] option #3 to schedule your \"Same Day\" appointment.

## 2019-06-18 ENCOUNTER — HOSPITAL ENCOUNTER (OUTPATIENT)
Age: 44
Setting detail: SPECIMEN
Discharge: HOME | End: 2019-06-18
Payer: COMMERCIAL

## 2019-06-18 DIAGNOSIS — R53.83 FATIGUE, UNSPECIFIED TYPE: ICD-10-CM

## 2019-06-18 LAB
ALBUMIN SERPL-MCNC: 3.8 G/DL (ref 3.5–5.2)
ALBUMIN/GLOBULIN RATIO: 1.2 (ref 1–2.5)
ALP BLD-CCNC: 64 U/L (ref 35–104)
ALT SERPL-CCNC: 19 U/L (ref 5–33)
ANION GAP SERPL CALCULATED.3IONS-SCNC: 13 MMOL/L (ref 9–17)
AST SERPL-CCNC: 18 U/L
BILIRUB SERPL-MCNC: 0.37 MG/DL (ref 0.3–1.2)
BUN BLDV-MCNC: 9 MG/DL (ref 6–20)
BUN/CREAT BLD: NORMAL (ref 9–20)
CALCIUM SERPL-MCNC: 8.7 MG/DL (ref 8.6–10.4)
CHLORIDE BLD-SCNC: 107 MMOL/L (ref 98–107)
CO2: 21 MMOL/L (ref 20–31)
CREAT SERPL-MCNC: 0.61 MG/DL (ref 0.5–0.9)
GFR AFRICAN AMERICAN: >60 ML/MIN
GFR NON-AFRICAN AMERICAN: >60 ML/MIN
GFR SERPL CREATININE-BSD FRML MDRD: NORMAL ML/MIN/{1.73_M2}
GFR SERPL CREATININE-BSD FRML MDRD: NORMAL ML/MIN/{1.73_M2}
GLUCOSE BLD-MCNC: 83 MG/DL (ref 70–99)
HCT VFR BLD CALC: 48.1 % (ref 36.3–47.1)
HEMOGLOBIN: 14.4 G/DL (ref 11.9–15.1)
MCH RBC QN AUTO: 28.5 PG (ref 25.2–33.5)
MCHC RBC AUTO-ENTMCNC: 29.9 G/DL (ref 28.4–34.8)
MCV RBC AUTO: 95.1 FL (ref 82.6–102.9)
NRBC AUTOMATED: 0 PER 100 WBC
PDW BLD-RTO: 13.3 % (ref 11.8–14.4)
PLATELET # BLD: 307 K/UL (ref 138–453)
PMV BLD AUTO: 10.4 FL (ref 8.1–13.5)
POTASSIUM SERPL-SCNC: 4 MMOL/L (ref 3.7–5.3)
RBC # BLD: 5.06 M/UL (ref 3.95–5.11)
SODIUM BLD-SCNC: 141 MMOL/L (ref 135–144)
TOTAL PROTEIN: 7 G/DL (ref 6.4–8.3)
TSH SERPL DL<=0.05 MIU/L-ACNC: 1.21 MIU/L (ref 0.3–5)
WBC # BLD: 7.2 K/UL (ref 3.5–11.3)

## 2019-06-24 ENCOUNTER — OFFICE VISIT (OUTPATIENT)
Dept: FAMILY MEDICINE CLINIC | Age: 44
End: 2019-06-24
Payer: COMMERCIAL

## 2019-06-24 VITALS
WEIGHT: 203 LBS | HEART RATE: 95 BPM | SYSTOLIC BLOOD PRESSURE: 95 MMHG | DIASTOLIC BLOOD PRESSURE: 66 MMHG | BODY MASS INDEX: 34.84 KG/M2 | TEMPERATURE: 98 F

## 2019-06-24 DIAGNOSIS — Z80.3 FAMILY HISTORY OF BREAST CANCER: ICD-10-CM

## 2019-06-24 DIAGNOSIS — R53.83 OTHER FATIGUE: Primary | ICD-10-CM

## 2019-06-24 PROCEDURE — G8427 DOCREV CUR MEDS BY ELIG CLIN: HCPCS | Performed by: STUDENT IN AN ORGANIZED HEALTH CARE EDUCATION/TRAINING PROGRAM

## 2019-06-24 PROCEDURE — 99213 OFFICE O/P EST LOW 20 MIN: CPT | Performed by: STUDENT IN AN ORGANIZED HEALTH CARE EDUCATION/TRAINING PROGRAM

## 2019-06-24 PROCEDURE — 1036F TOBACCO NON-USER: CPT | Performed by: STUDENT IN AN ORGANIZED HEALTH CARE EDUCATION/TRAINING PROGRAM

## 2019-06-24 PROCEDURE — G8417 CALC BMI ABV UP PARAM F/U: HCPCS | Performed by: STUDENT IN AN ORGANIZED HEALTH CARE EDUCATION/TRAINING PROGRAM

## 2019-06-24 ASSESSMENT — ENCOUNTER SYMPTOMS
STRIDOR: 0
ABDOMINAL PAIN: 0
WHEEZING: 0
SHORTNESS OF BREATH: 0
NAUSEA: 0
COUGH: 0
CONSTIPATION: 0

## 2019-06-24 NOTE — PROGRESS NOTES
Subjective:     Wilbert Garces is a 40 y.o. female with  has a past medical history of Chronic back pain, Obesity, and Stroke ([address] Family History   Problem Relation Age of Onset    Hearing Loss Mother     Diabetes Mother     High Blood Pressure Mother     Heart Disease Mother     No Known Problems Father     No Known Problems Sister     No Known Problems Brother        Presented to the office today for:  Chief Complaint   Patient presents with    Fatigue       HPI     Follow up fatigue  States symptoms have been going on for past 4-5 months  Denies symptoms of depression or anxiety  Denies body aches and pains  Denies fever, night sweats or weight loss      Denies snoring at night or day time sleepiness    Has family h/o maternal aunt and cousin being diagnosed with breast cancer I their 45s  Had mamogram done last year- no mass seen  Denies feeling breast lumps    Review of Systems   Constitutional: Positive for fever. Negative for activity change, appetite change, chills, diaphoresis, fatigue and unexpected weight change. Respiratory: Negative for cough, shortness of breath, wheezing and stridor. Cardiovascular: Negative for chest pain, palpitations and leg swelling. Gastrointestinal: Negative for abdominal pain, constipation and nausea. Neurological: Negative for dizziness and light-headedness. Objective:    BP 95/66 (Site: Left Upper Arm, Position: Sitting, Cuff Size: Large Adult)   Pulse 95   Temp 98 °F (36.7 °C) (Oral)   Wt 203 lb (92.1 kg)   BMI 34.84 kg/m²    BP Readings from Last 3 Encounters:   06/24/19 95/66   06/17/19 84/60   03/14/19 103/76     Physical Exam   Constitutional: She appears well-developed and well-nourished. No distress. HENT:   Mouth/Throat: Oropharynx is clear and moist.   Neck: Neck supple. No thyromegaly present. Cardiovascular: Normal rate, regular rhythm, normal heart sounds and intact distal pulses. Exam reveals no gallop and no friction rub. No murmur heard. Pulmonary/Chest: Effort normal and breath sounds normal. No stridor. No respiratory distress. She has no wheezes. She has no rales. She exhibits no tenderness. Musculoskeletal: She exhibits no edema. Skin: She is not diaphoretic. Lab Results   Component Value Date    WBC 7.2 06/18/2019    HGB 14.4 06/18/2019    HCT 48.1 (H) 06/18/2019     06/18/2019    CHOL 124 08/06/2018    TRIG 83 08/06/2018    HDL 42 08/06/2018    ALT 19 06/18/2019    AST 18 06/18/2019     06/18/2019    K 4.0 06/18/2019     06/18/2019    CREATININE 0.61 06/18/2019    BUN 9 06/18/2019    CO2 21 06/18/2019    TSH 1.21 06/18/2019    LABA1C 5.1 08/06/2018     Lab Results   Component Value Date    CALCIUM 8.7 06/18/2019     Lab Results   Component Value Date    LDLCHOLESTEROL 65 08/06/2018       Assessment and Plan:    1. Other fatigue    - Vitamin D 25 Hydroxy; Future  - consider fibromyalgia work up if above testing negative    2. Family history of breast cancer    - [facility]          Requested Prescriptions      No prescriptions requested or ordered in this encounter       Medications Discontinued During This Encounter   Medication Reason    azithromycin (ZITHROMAX Z-GERMAN) 250 MG tablet Therapy completed    cyclobenzaprine (FLEXERIL) 10 MG tablet Therapy completed    fluticasone (FLONASE) 50 MCG/ACT nasal spray Therapy completed     MG tablet Therapy completed       Sara received counseling on the following healthy behaviors:nutrition, exercise and medication adherence     All patient questionsanswered. Pt voiced understanding. Return in about 1 month (around 7/24/2019), or if symptoms worsen or fail to improve, for fatigue.

## 2019-06-24 NOTE — PROGRESS NOTES
Attending Physician Statement  I have discussed the care of Starling Harper, including pertinent history and exam findings,  with the resident. I have reviewed the key elements of all parts of the encounter with the resident. I agree with the assessment, plan and orders as documented by the resident. ([address] [place] [facility] Ilana Dunbar M.D  Vitals:    06/24/19 1524   BP: 95/66   Pulse: 95   Temp: 98 °F (36.7 °C)     1. Other fatigue    2. Family history of breast cancer    depression screen next visit. sleep apnea screen

## 2019-06-24 NOTE — PROGRESS NOTES
Visit Information    Have you changed or started any medications since your last visit including any over-the-counter medicines, vitamins, or herbal medicines? no   Have you stopped taking any of your medications? Is so, why? -  no  Are you having any side effects from any of your medications? - no    Have you seen any other physician or provider since your last visit?  no   Have you had any other diagnostic tests since your last visit?  no   Have you been seen in the emergency room and/or had an admission in a hospital since we last saw you?  no   Have you had your routine dental cleaning in the past 6 months?  no     Do you have an active MyChart account? If no, what is the barrier?   No:     Patient Care Team:  Ronna Rogers MD as PCP - General (Family Medicine)  Aura Sneed DPM as Physician (Podiatry)    Medical History Review  Past Medical, Family, and Social History reviewed and does not contribute to the patient presenting condition    Health Maintenance   Topic Date Due    HIV screen  04/14/1990    Flu vaccine (Season Ended) 02/25/2020 (Originally 9/1/2019)    DTaP/Tdap/Td vaccine (1 - Tdap) 03/13/2020 (Originally 4/14/1994)    Cervical cancer screen  03/13/2020 (Originally 4/14/1996)    Lipid screen  08/06/2023    Pneumococcal 0-64 years Vaccine  Aged Out

## 2019-07-26 ENCOUNTER — HOSPITAL ENCOUNTER (OUTPATIENT)
Facility: MEDICAL CENTER | Age: 44
End: 2019-07-26
Payer: COMMERCIAL

## 2019-08-01 ENCOUNTER — INITIAL CONSULT (OUTPATIENT)
Dept: ONCOLOGY | Age: 44
End: 2019-08-01
Payer: COMMERCIAL

## 2019-08-01 DIAGNOSIS — Z80.3 FAMILY HISTORY OF BREAST CANCER: Primary | ICD-10-CM

## 2019-08-01 PROCEDURE — 96040 PR GENETIC COUNSELING, EACH 30 MIN: CPT | Performed by: GENETIC COUNSELOR, MS

## 2019-09-16 DIAGNOSIS — I63.9 CEREBROVASCULAR ACCIDENT (CVA), UNSPECIFIED MECHANISM (HCC): ICD-10-CM

## 2019-09-17 RX ORDER — ASPIRIN 81 MG/1
81 TABLET ORAL DAILY
Qty: 30 TABLET | Refills: 3 | Status: SHIPPED | OUTPATIENT
Start: 2019-09-17

## 2019-10-07 ENCOUNTER — OFFICE VISIT (OUTPATIENT)
Dept: FAMILY MEDICINE CLINIC | Age: 44
End: 2019-10-07
Payer: COMMERCIAL

## 2019-10-07 VITALS
DIASTOLIC BLOOD PRESSURE: 66 MMHG | WEIGHT: 203.8 LBS | SYSTOLIC BLOOD PRESSURE: 105 MMHG | BODY MASS INDEX: 34.98 KG/M2 | HEART RATE: 81 BPM

## 2019-10-07 DIAGNOSIS — M54.50 ACUTE BILATERAL LOW BACK PAIN WITHOUT SCIATICA: Primary | ICD-10-CM

## 2019-10-07 PROCEDURE — G8598 ASA/ANTIPLAT THER USED: HCPCS | Performed by: STUDENT IN AN ORGANIZED HEALTH CARE EDUCATION/TRAINING PROGRAM

## 2019-10-07 PROCEDURE — 99213 OFFICE O/P EST LOW 20 MIN: CPT | Performed by: STUDENT IN AN ORGANIZED HEALTH CARE EDUCATION/TRAINING PROGRAM

## 2019-10-07 PROCEDURE — 1036F TOBACCO NON-USER: CPT | Performed by: STUDENT IN AN ORGANIZED HEALTH CARE EDUCATION/TRAINING PROGRAM

## 2019-10-07 PROCEDURE — G8484 FLU IMMUNIZE NO ADMIN: HCPCS | Performed by: STUDENT IN AN ORGANIZED HEALTH CARE EDUCATION/TRAINING PROGRAM

## 2019-10-07 PROCEDURE — G8417 CALC BMI ABV UP PARAM F/U: HCPCS | Performed by: STUDENT IN AN ORGANIZED HEALTH CARE EDUCATION/TRAINING PROGRAM

## 2019-10-07 PROCEDURE — G8427 DOCREV CUR MEDS BY ELIG CLIN: HCPCS | Performed by: STUDENT IN AN ORGANIZED HEALTH CARE EDUCATION/TRAINING PROGRAM

## 2019-10-07 RX ORDER — IBUPROFEN 600 MG/1
TABLET ORAL
Qty: 30 TABLET | Refills: 0 | Status: SHIPPED | OUTPATIENT
Start: 2019-10-07 | End: 2019-12-06 | Stop reason: SDUPTHER

## 2019-10-07 ASSESSMENT — ENCOUNTER SYMPTOMS
BACK PAIN: 1
PHOTOPHOBIA: 0
SHORTNESS OF BREATH: 0

## 2019-11-14 ENCOUNTER — TELEPHONE (OUTPATIENT)
Dept: FAMILY MEDICINE CLINIC | Age: 44
End: 2019-11-14

## 2019-12-06 DIAGNOSIS — M54.50 ACUTE BILATERAL LOW BACK PAIN WITHOUT SCIATICA: ICD-10-CM

## 2019-12-06 RX ORDER — IBUPROFEN 600 MG/1
TABLET ORAL
Qty: 30 TABLET | Refills: 0 | Status: SHIPPED | OUTPATIENT
Start: 2019-12-06 | End: 2020-04-12

## 2020-04-12 RX ORDER — IBUPROFEN 600 MG/1
TABLET ORAL
Qty: 30 TABLET | Refills: 0 | Status: SHIPPED | OUTPATIENT
Start: 2020-04-12

## 2020-08-20 ENCOUNTER — HISTORICAL (OUTPATIENT)
Dept: ADMINISTRATIVE | Facility: HOSPITAL | Age: 45
End: 2020-08-20

## 2020-10-26 ENCOUNTER — HISTORICAL (OUTPATIENT)
Dept: ADMINISTRATIVE | Facility: HOSPITAL | Age: 45
End: 2020-10-26

## 2021-06-14 ENCOUNTER — HOSPITAL ENCOUNTER (EMERGENCY)
Facility: HOSPITAL | Age: 46
Discharge: HOME | End: 2021-06-14
Payer: MEDICAID

## 2021-06-14 VITALS
OXYGEN SATURATION: 99 % | HEIGHT: 64 IN | RESPIRATION RATE: 18 BRPM | HEART RATE: 96 BPM | TEMPERATURE: 99 F | DIASTOLIC BLOOD PRESSURE: 66 MMHG | SYSTOLIC BLOOD PRESSURE: 101 MMHG | BODY MASS INDEX: 30.73 KG/M2 | WEIGHT: 180 LBS

## 2021-06-14 DIAGNOSIS — E86.0 MILD DEHYDRATION: ICD-10-CM

## 2021-06-14 DIAGNOSIS — R42 DIZZINESS: ICD-10-CM

## 2021-06-14 DIAGNOSIS — R10.9 ABDOMINAL PAIN: ICD-10-CM

## 2021-06-14 DIAGNOSIS — K52.9 GASTROENTERITIS: Primary | ICD-10-CM

## 2021-06-14 LAB
ALBUMIN SERPL BCP-MCNC: 3.3 G/DL (ref 3.5–5)
ALBUMIN/GLOB SERPL: 0.9 {RATIO}
ALP SERPL-CCNC: 80 U/L (ref 39–100)
ALT SERPL W P-5'-P-CCNC: 35 U/L (ref 13–56)
ANION GAP SERPL CALCULATED.3IONS-SCNC: 15 MMOL/L (ref 7–16)
AST SERPL W P-5'-P-CCNC: 18 U/L (ref 15–37)
BASOPHILS # BLD AUTO: 0.03 K/UL (ref 0–0.2)
BASOPHILS NFR BLD AUTO: 0.3 % (ref 0–1)
BILIRUB SERPL-MCNC: 0.4 MG/DL (ref 0–1.2)
BILIRUB UR QL STRIP: NEGATIVE
BUN SERPL-MCNC: 7 MG/DL (ref 7–18)
BUN/CREAT SERPL: 8 (ref 6–20)
CALCIUM SERPL-MCNC: 8.7 MG/DL (ref 8.5–10.1)
CHLORIDE SERPL-SCNC: 105 MMOL/L (ref 98–107)
CLARITY UR: CLEAR
CO2 SERPL-SCNC: 26 MMOL/L (ref 21–32)
COLOR UR: YELLOW
CREAT SERPL-MCNC: 0.88 MG/DL (ref 0.55–1.02)
DIFFERENTIAL METHOD BLD: ABNORMAL
EOSINOPHIL # BLD AUTO: 1.99 K/UL (ref 0–0.5)
EOSINOPHIL NFR BLD AUTO: 21.5 % (ref 1–4)
ERYTHROCYTE [DISTWIDTH] IN BLOOD BY AUTOMATED COUNT: 13.5 % (ref 11.5–14.5)
FLUAV AG UPPER RESP QL IA.RAPID: NEGATIVE
FLUBV AG UPPER RESP QL IA.RAPID: NEGATIVE
GLOBULIN SER-MCNC: 3.7 G/DL (ref 2–4)
GLUCOSE SERPL-MCNC: 93 MG/DL (ref 74–106)
GLUCOSE UR STRIP-MCNC: NEGATIVE MG/DL
HCT VFR BLD AUTO: 42.3 % (ref 38–47)
HGB BLD-MCNC: 13.7 G/DL (ref 12–16)
KETONES UR STRIP-SCNC: NEGATIVE MG/DL
LACTATE SERPL-SCNC: 1.1 MMOL/L (ref 0.4–2)
LEUKOCYTE ESTERASE UR QL STRIP: NEGATIVE
LIPASE SERPL-CCNC: 162 U/L (ref 73–393)
LYMPHOCYTES # BLD AUTO: 4.18 K/UL (ref 1–4.8)
LYMPHOCYTES NFR BLD AUTO: 45.1 % (ref 27–41)
MCH RBC QN AUTO: 29.3 PG (ref 27–31)
MCHC RBC AUTO-ENTMCNC: 32.4 G/DL (ref 32–36)
MCV RBC AUTO: 90.6 FL (ref 80–96)
MONOCYTES # BLD AUTO: 0.6 K/UL (ref 0–0.8)
MONOCYTES NFR BLD AUTO: 6.5 % (ref 2–6)
MPC BLD CALC-MCNC: 10 FL (ref 9.4–12.4)
NEUTROPHILS # BLD AUTO: 2.47 K/UL (ref 1.8–7.7)
NEUTROPHILS NFR BLD AUTO: 26.6 % (ref 53–65)
NITRITE UR QL STRIP: NEGATIVE
PH UR STRIP: 5.5 PH UNITS
PLATELET # BLD AUTO: 262 K/UL (ref 150–400)
POTASSIUM SERPL-SCNC: 3.7 MMOL/L (ref 3.5–5.1)
PROT SERPL-MCNC: 7 G/DL (ref 6.4–8.2)
PROT UR QL STRIP: NEGATIVE
RBC # BLD AUTO: 4.67 M/UL (ref 4.2–5.4)
RBC # UR STRIP: NEGATIVE /UL
SARS-COV+SARS-COV-2 AG RESP QL IA.RAPID: NEGATIVE
SODIUM SERPL-SCNC: 142 MMOL/L (ref 136–145)
SP GR UR STRIP: >=1.03
TROPONIN I SERPL-MCNC: <0.017 NG/ML
UROBILINOGEN UR STRIP-ACNC: 0.2 MG/DL
WBC # BLD AUTO: 9.27 K/UL (ref 4.5–11)

## 2021-06-14 PROCEDURE — 83690 ASSAY OF LIPASE: CPT | Performed by: NURSE PRACTITIONER

## 2021-06-14 PROCEDURE — 99284 PR EMERGENCY DEPT VISIT,LEVEL IV: ICD-10-PCS | Mod: ,,, | Performed by: NURSE PRACTITIONER

## 2021-06-14 PROCEDURE — 80053 COMPREHEN METABOLIC PANEL: CPT | Performed by: NURSE PRACTITIONER

## 2021-06-14 PROCEDURE — 99284 EMERGENCY DEPT VISIT MOD MDM: CPT | Mod: ,,, | Performed by: NURSE PRACTITIONER

## 2021-06-14 PROCEDURE — 85025 COMPLETE CBC W/AUTO DIFF WBC: CPT | Performed by: NURSE PRACTITIONER

## 2021-06-14 PROCEDURE — 81003 URINALYSIS AUTO W/O SCOPE: CPT | Performed by: NURSE PRACTITIONER

## 2021-06-14 PROCEDURE — 93010 ELECTROCARDIOGRAM REPORT: CPT | Performed by: HOSPITALIST

## 2021-06-14 PROCEDURE — 96375 TX/PRO/DX INJ NEW DRUG ADDON: CPT

## 2021-06-14 PROCEDURE — 25000003 PHARM REV CODE 250: Performed by: NURSE PRACTITIONER

## 2021-06-14 PROCEDURE — 83605 ASSAY OF LACTIC ACID: CPT | Performed by: NURSE PRACTITIONER

## 2021-06-14 PROCEDURE — 96361 HYDRATE IV INFUSION ADD-ON: CPT

## 2021-06-14 PROCEDURE — 99285 EMERGENCY DEPT VISIT HI MDM: CPT | Mod: 25

## 2021-06-14 PROCEDURE — 84484 ASSAY OF TROPONIN QUANT: CPT | Performed by: NURSE PRACTITIONER

## 2021-06-14 PROCEDURE — 87428 SARSCOV & INF VIR A&B AG IA: CPT | Performed by: NURSE PRACTITIONER

## 2021-06-14 PROCEDURE — 96374 THER/PROPH/DIAG INJ IV PUSH: CPT

## 2021-06-14 PROCEDURE — 93005 ELECTROCARDIOGRAM TRACING: CPT

## 2021-06-14 PROCEDURE — 63600175 PHARM REV CODE 636 W HCPCS: Performed by: NURSE PRACTITIONER

## 2021-06-14 PROCEDURE — 36415 COLL VENOUS BLD VENIPUNCTURE: CPT | Performed by: NURSE PRACTITIONER

## 2021-06-14 RX ORDER — MORPHINE SULFATE 4 MG/ML
4 INJECTION, SOLUTION INTRAMUSCULAR; INTRAVENOUS
Status: COMPLETED | OUTPATIENT
Start: 2021-06-14 | End: 2021-06-14

## 2021-06-14 RX ORDER — ASPIRIN 81 MG/1
81 TABLET ORAL WEEKLY
COMMUNITY

## 2021-06-14 RX ORDER — ONDANSETRON 2 MG/ML
4 INJECTION INTRAMUSCULAR; INTRAVENOUS
Status: COMPLETED | OUTPATIENT
Start: 2021-06-14 | End: 2021-06-14

## 2021-06-14 RX ADMIN — ONDANSETRON 4 MG: 2 INJECTION INTRAMUSCULAR; INTRAVENOUS at 10:06

## 2021-06-14 RX ADMIN — SODIUM CHLORIDE 1000 ML: 9 INJECTION, SOLUTION INTRAVENOUS at 09:06

## 2021-06-14 RX ADMIN — MORPHINE SULFATE 4 MG: 4 INJECTION, SOLUTION INTRAMUSCULAR; INTRAVENOUS at 10:06

## 2021-10-22 ENCOUNTER — HOSPITAL ENCOUNTER (EMERGENCY)
Facility: HOSPITAL | Age: 46
Discharge: HOME | End: 2021-10-22
Attending: EMERGENCY MEDICINE
Payer: MEDICAID

## 2021-10-22 VITALS
BODY MASS INDEX: 29.88 KG/M2 | OXYGEN SATURATION: 99 % | DIASTOLIC BLOOD PRESSURE: 82 MMHG | SYSTOLIC BLOOD PRESSURE: 119 MMHG | RESPIRATION RATE: 18 BRPM | HEIGHT: 64 IN | TEMPERATURE: 99 F | WEIGHT: 175 LBS | HEART RATE: 102 BPM

## 2021-10-22 DIAGNOSIS — G44.89 HEADACHE SYNDROME: Primary | ICD-10-CM

## 2021-10-22 PROCEDURE — 99283 EMERGENCY DEPT VISIT LOW MDM: CPT | Mod: ,,, | Performed by: EMERGENCY MEDICINE

## 2021-10-22 PROCEDURE — 99285 EMERGENCY DEPT VISIT HI MDM: CPT | Mod: 25

## 2021-10-22 PROCEDURE — 99283 PR EMERGENCY DEPT VISIT,LEVEL III: ICD-10-PCS | Mod: ,,, | Performed by: EMERGENCY MEDICINE

## 2021-10-22 PROCEDURE — 63600175 PHARM REV CODE 636 W HCPCS: Performed by: EMERGENCY MEDICINE

## 2021-10-22 PROCEDURE — 96372 THER/PROPH/DIAG INJ SC/IM: CPT

## 2021-10-22 RX ORDER — PROCHLORPERAZINE EDISYLATE 5 MG/ML
10 INJECTION INTRAMUSCULAR; INTRAVENOUS
Status: COMPLETED | OUTPATIENT
Start: 2021-10-22 | End: 2021-10-22

## 2021-10-22 RX ADMIN — PROCHLORPERAZINE EDISYLATE 10 MG: 5 INJECTION INTRAMUSCULAR; INTRAVENOUS at 09:10

## 2022-07-21 ENCOUNTER — HOSPITAL ENCOUNTER (EMERGENCY)
Facility: HOSPITAL | Age: 47
Discharge: HOME | End: 2022-07-21
Payer: MEDICAID

## 2022-07-21 VITALS
OXYGEN SATURATION: 98 % | DIASTOLIC BLOOD PRESSURE: 56 MMHG | SYSTOLIC BLOOD PRESSURE: 91 MMHG | HEIGHT: 64 IN | WEIGHT: 170 LBS | TEMPERATURE: 98 F | HEART RATE: 81 BPM | BODY MASS INDEX: 29.02 KG/M2 | RESPIRATION RATE: 18 BRPM

## 2022-07-21 DIAGNOSIS — R51.9 ACUTE NONINTRACTABLE HEADACHE, UNSPECIFIED HEADACHE TYPE: Primary | ICD-10-CM

## 2022-07-21 LAB
FLUAV AG UPPER RESP QL IA.RAPID: NEGATIVE
FLUBV AG UPPER RESP QL IA.RAPID: NEGATIVE
SARS-COV+SARS-COV-2 AG RESP QL IA.RAPID: NEGATIVE

## 2022-07-21 PROCEDURE — 99284 EMERGENCY DEPT VISIT MOD MDM: CPT | Mod: ,,, | Performed by: NURSE PRACTITIONER

## 2022-07-21 PROCEDURE — 87428 SARSCOV & INF VIR A&B AG IA: CPT | Performed by: NURSE PRACTITIONER

## 2022-07-21 PROCEDURE — 99284 PR EMERGENCY DEPT VISIT,LEVEL IV: ICD-10-PCS | Mod: ,,, | Performed by: NURSE PRACTITIONER

## 2022-07-21 PROCEDURE — 99285 EMERGENCY DEPT VISIT HI MDM: CPT | Mod: 25

## 2022-07-21 PROCEDURE — 63600175 PHARM REV CODE 636 W HCPCS: Performed by: NURSE PRACTITIONER

## 2022-07-21 PROCEDURE — 96372 THER/PROPH/DIAG INJ SC/IM: CPT

## 2022-07-21 RX ORDER — KETOROLAC TROMETHAMINE 30 MG/ML
30 INJECTION, SOLUTION INTRAMUSCULAR; INTRAVENOUS
Status: COMPLETED | OUTPATIENT
Start: 2022-07-21 | End: 2022-07-21

## 2022-07-21 RX ADMIN — KETOROLAC TROMETHAMINE 30 MG: 30 INJECTION, SOLUTION INTRAMUSCULAR at 03:07

## 2022-07-21 NOTE — Clinical Note
"Kavitha Morenoa" Gemini was seen and treated in our emergency department on 7/21/2022.  She may return to work on 07/22/2022.       If you have any questions or concerns, please don't hesitate to call.      GIDEON Neal"

## 2022-07-21 NOTE — ED PROVIDER NOTES
Encounter Date: 7/21/2022       History     Chief Complaint   Patient presents with    Headache     Started last night     47 year old AAF with past medical history of CVA and HA.  Pt has mils residual weakness to RUE from the previous CVA. Pt reports this HA started yesterday.  She also reports nausea and nasal congestion. Denies fever, neck pain, increased weakness, dizziness, body aches, sore throat, chest pain, SOB, cough.  Pt reports taking Tylenol last night without relief.  Pt was seen in the ER at [facility] on October 2021 for HA. She had CT head negative for acute findings.  She was given Compazine with good relief.        The history is provided by the patient.   Headache   This is a new problem. The current episode started yesterday. The problem occurs constantly. The problem has been unchanged. The pain is located in the frontal region. The pain does not radiate. The pain quality is similar to prior headaches. The quality of the pain is described as aching and throbbing. The pain is at a severity of 10/10. Associated symptoms include nausea. Pertinent negatives include no abdominal pain, abnormal behavior, anorexia, back pain, blurred vision, coughing, dizziness, drainage, ear pain, eye pain, eye redness, eye watering, facial sweating, fever, hearing loss, insomnia, loss of balance, muscle aches, neck pain, numbness, phonophobia, photophobia, rhinorrhea, scalp tenderness, seizures, sinus pressure, sore throat, swollen glands, tingling, tinnitus, visual change, vomiting, weakness or weight loss. Nothing aggravates the symptoms. She has tried acetaminophen for the symptoms. The treatment provided no relief. Her past medical history is significant for cancer and migraine headaches. There is no history of cluster headaches, hypertension, immunosuppression, obesity, pseudotumor cerebri, recent head traumas, sinus disease or TMJ.     Review of patient's allergies indicates:  No Known Allergies  Past Medical  History:   Diagnosis Date    Anticoagulant long-term use     Cancer     Migraine headache     Stroke      Past Surgical History:   Procedure Laterality Date    BREAST SURGERY      HYSTERECTOMY       No family history on file.  Social History     Tobacco Use    Smoking status: Never Smoker    Smokeless tobacco: Never Used   Substance Use Topics    Alcohol use: Never    Drug use: Never     Review of Systems   Constitutional: Negative for fever and weight loss.   HENT: Positive for congestion. Negative for ear pain, hearing loss, rhinorrhea, sinus pressure, sore throat and tinnitus.    Eyes: Negative for blurred vision, photophobia, pain and redness.   Respiratory: Negative for cough and shortness of breath.    Cardiovascular: Negative for chest pain.   Gastrointestinal: Positive for nausea. Negative for abdominal pain, anorexia and vomiting.   Genitourinary: Negative for dysuria.   Musculoskeletal: Negative for back pain and neck pain.   Skin: Negative for rash.   Neurological: Positive for headaches. Negative for dizziness, tingling, seizures, weakness, numbness and loss of balance.   Hematological: Does not bruise/bleed easily.   Psychiatric/Behavioral: The patient does not have insomnia.        Physical Exam     Initial Vitals [07/21/22 1346]   BP Pulse Resp Temp SpO2   118/65 82 18 98.2 °F (36.8 °C) 98 %      MAP       --         Physical Exam    Nursing note and vitals reviewed.  Constitutional: She appears well-developed and well-nourished. She is not diaphoretic. She is cooperative.  Non-toxic appearance. She does not have a sickly appearance. She does not appear ill. No distress.   HENT:   Head: Normocephalic and atraumatic.   Eyes: Pupils are equal, round, and reactive to light.   Neck: Neck supple.   Cardiovascular: Normal rate, regular rhythm, normal heart sounds and intact distal pulses. Exam reveals no gallop and no friction rub.    No murmur heard.  Pulmonary/Chest: Breath sounds normal. No  respiratory distress. She has no wheezes. She has no rhonchi. She has no rales. She exhibits no tenderness.   Musculoskeletal:      Cervical back: Neck supple.     Neurological: She is alert and oriented to person, place, and time. She is not disoriented. GCS eye subscore is 4. GCS verbal subscore is 5. GCS motor subscore is 6.   Mild right upper ext weakness.  No new focal deficits on exam.   Skin: Skin is warm and dry. Capillary refill takes less than 2 seconds.   Psychiatric: She has a normal mood and affect.         Medical Screening Exam   See Full Note    ED Course   Procedures  Labs Reviewed   SARS-COV2 (COVID) W/ FLU ANTIGEN - Normal    Narrative:     Negative SARS-CoV results should not be used as the sole basis for treatment or patient management decisions; negative results should be considered in the context of a patient's recent exposures, history and the presene of clinical signs and symptoms consistent with COVID-19.  Negative results should be treated as presumptive and confirmed by molecular assay, if necessary for patient management.          Imaging Results          CT Head Without Contrast (Final result)  Result time 07/21/22 14:42:19    Final result by Celso Odom II, MD (07/21/22 14:42:19)                 Impression:      No evidence of acute process or interval change.      Electronically signed by: Celso Odom  Date:    07/21/2022  Time:    14:42             Narrative:    EXAMINATION:  CT HEAD WITHOUT CONTRAST    CLINICAL HISTORY:  frontal headache, hx of CVA;    TECHNIQUE:  Axial CT imaging of the brain is performed without contrast with 3 mm increments.    CT dose reduction technique used - Dose Rite and tube current modulation.    COMPARISON:  22 October 2021    FINDINGS:  No evidence of hemorrhage, mass, mass effect, midline shift or acute infarct seen.  Volume loss and decreased density in the left temporoparietal lobe unchanged from previous study.  Remaining brain parenchyma  attenuation and differentiation appears within normal limits. The ventricles and cisterns are normal in caliber.  No cranial or skull base abnormality is identified.                                 Medications   ketorolac injection 30 mg (30 mg Intramuscular Given 7/21/22 1520)                 ED Course as of 07/21/22 1527   Thu Jul 21, 2022   1509 Toradol given for pain, no .  [AG]      ED Course User Index  [AG] GIDEON Neal          Clinical Impression:   Final diagnoses:  [R51.9] Acute nonintractable headache, unspecified headache type (Primary)          ED Disposition Condition    Discharge Stable        ED Prescriptions     None        Follow-up Information     Follow up With Specialties Details Why Contact Info    [facility] [[email or web address]  Schedule an appointment as soon as possible for a visit in 3 days As needed, If symptoms worsen [address] [facility] [phone]           GIDEON Neal  07/21/22 1526

## 2022-07-21 NOTE — DISCHARGE INSTRUCTIONS
Go home and rest.  Drink plenty of water.  Take your Aspiring as directed by your neurologist.  Return for dizziness, weakness, numbness, difficulty walking/talking or other concerning symptoms.

## 2022-07-23 ENCOUNTER — TELEPHONE (OUTPATIENT)
Dept: EMERGENCY MEDICINE | Facility: HOSPITAL | Age: 47
End: 2022-07-23
Payer: MEDICAID

## 2022-12-21 ENCOUNTER — HOSPITAL ENCOUNTER (EMERGENCY)
Facility: HOSPITAL | Age: 47
Discharge: HOME | End: 2022-12-22
Payer: MEDICAID

## 2022-12-21 DIAGNOSIS — R42 DIZZINESS: ICD-10-CM

## 2022-12-21 DIAGNOSIS — R51.9 ACUTE NONINTRACTABLE HEADACHE, UNSPECIFIED HEADACHE TYPE: ICD-10-CM

## 2022-12-21 DIAGNOSIS — J01.10 ACUTE FRONTAL SINUSITIS, RECURRENCE NOT SPECIFIED: Primary | ICD-10-CM

## 2022-12-21 LAB
ALBUMIN SERPL BCP-MCNC: 3.2 G/DL (ref 3.5–5)
ALBUMIN/GLOB SERPL: 0.9 {RATIO}
ALP SERPL-CCNC: 75 U/L (ref 39–100)
ALT SERPL W P-5'-P-CCNC: 21 U/L (ref 13–56)
AMORPH PHOS CRY #/AREA URNS LPF: ABNORMAL /LPF
AMPHET UR QL SCN: NEGATIVE
ANION GAP SERPL CALCULATED.3IONS-SCNC: 8 MMOL/L (ref 7–16)
AST SERPL W P-5'-P-CCNC: 17 U/L (ref 15–37)
BACTERIA #/AREA URNS HPF: ABNORMAL /HPF
BARBITURATES UR QL SCN: NEGATIVE
BASOPHILS # BLD AUTO: 0.04 K/UL (ref 0–0.2)
BASOPHILS NFR BLD AUTO: 0.5 % (ref 0–1)
BENZODIAZ METAB UR QL SCN: NEGATIVE
BILIRUB SERPL-MCNC: 0.2 MG/DL (ref ?–1.2)
BILIRUB UR QL STRIP: NEGATIVE
BUN SERPL-MCNC: 10 MG/DL (ref 7–18)
BUN/CREAT SERPL: 11 (ref 6–20)
CALCIUM SERPL-MCNC: 8.3 MG/DL (ref 8.5–10.1)
CANNABINOIDS UR QL SCN: NEGATIVE
CHLORIDE SERPL-SCNC: 107 MMOL/L (ref 98–107)
CLARITY UR: ABNORMAL
CO2 SERPL-SCNC: 28 MMOL/L (ref 21–32)
COCAINE UR QL SCN: NEGATIVE
COLOR UR: YELLOW
CREAT SERPL-MCNC: 0.89 MG/DL (ref 0.55–1.02)
DIFFERENTIAL METHOD BLD: ABNORMAL
EGFR (NO RACE VARIABLE) (RUSH/TITUS): 81 ML/MIN/1.73M²
EOSINOPHIL # BLD AUTO: 0.31 K/UL (ref 0–0.5)
EOSINOPHIL NFR BLD AUTO: 3.9 % (ref 1–4)
ERYTHROCYTE [DISTWIDTH] IN BLOOD BY AUTOMATED COUNT: 13 % (ref 11.5–14.5)
ETHANOL, BLOOD (CATEGORY): NOT DETECTED
GLOBULIN SER-MCNC: 3.5 G/DL (ref 2–4)
GLUCOSE SERPL-MCNC: 106 MG/DL (ref 74–106)
GLUCOSE UR STRIP-MCNC: NEGATIVE MG/DL
HCT VFR BLD AUTO: 38.6 % (ref 38–47)
HGB BLD-MCNC: 13.7 G/DL (ref 12–16)
KETONES UR STRIP-SCNC: NEGATIVE MG/DL
LEUKOCYTE ESTERASE UR QL STRIP: ABNORMAL
LYMPHOCYTES # BLD AUTO: 2.51 K/UL (ref 1–4.8)
LYMPHOCYTES NFR BLD AUTO: 31.4 % (ref 27–41)
MAGNESIUM SERPL-MCNC: 2.1 MG/DL (ref 1.7–2.3)
MCH RBC QN AUTO: 31.5 PG (ref 27–31)
MCHC RBC AUTO-ENTMCNC: 35.5 G/DL (ref 32–36)
MCV RBC AUTO: 88.7 FL (ref 80–96)
MONOCYTES # BLD AUTO: 0.69 K/UL (ref 0–0.8)
MONOCYTES NFR BLD AUTO: 8.6 % (ref 2–6)
MPC BLD CALC-MCNC: 9.9 FL (ref 9.4–12.4)
NEUTROPHILS # BLD AUTO: 4.44 K/UL (ref 1.8–7.7)
NEUTROPHILS NFR BLD AUTO: 55.6 % (ref 53–65)
NITRITE UR QL STRIP: NEGATIVE
NT-PROBNP SERPL-MCNC: 39 PG/ML (ref 1–125)
OPIATES UR QL SCN: NEGATIVE
PCP UR QL SCN: NEGATIVE
PH UR STRIP: 7.5 PH UNITS
PLATELET # BLD AUTO: 261 K/UL (ref 150–400)
POTASSIUM SERPL-SCNC: 3.8 MMOL/L (ref 3.5–5.1)
PROT SERPL-MCNC: 6.7 G/DL (ref 6.4–8.2)
PROT UR QL STRIP: NEGATIVE
RBC # BLD AUTO: 4.35 M/UL (ref 4.2–5.4)
RBC # UR STRIP: NEGATIVE /UL
SODIUM SERPL-SCNC: 139 MMOL/L (ref 136–145)
SP GR UR STRIP: 1.02
SQUAMOUS #/AREA URNS LPF: ABNORMAL /LPF
TROPONIN I SERPL HS-MCNC: 4.1 PG/ML
UROBILINOGEN UR STRIP-ACNC: 0.2 MG/DL
WBC # BLD AUTO: 7.99 K/UL (ref 4.5–11)
WBC #/AREA URNS HPF: ABNORMAL /HPF

## 2022-12-21 PROCEDURE — 36415 COLL VENOUS BLD VENIPUNCTURE: CPT | Performed by: NURSE PRACTITIONER

## 2022-12-21 PROCEDURE — 87086 URINE CULTURE/COLONY COUNT: CPT | Performed by: NURSE PRACTITIONER

## 2022-12-21 PROCEDURE — 93010 EKG 12-LEAD: ICD-10-PCS | Mod: ,,, | Performed by: HOSPITALIST

## 2022-12-21 PROCEDURE — 85025 COMPLETE CBC W/AUTO DIFF WBC: CPT | Performed by: NURSE PRACTITIONER

## 2022-12-21 PROCEDURE — 84484 ASSAY OF TROPONIN QUANT: CPT | Performed by: NURSE PRACTITIONER

## 2022-12-21 PROCEDURE — 99285 EMERGENCY DEPT VISIT HI MDM: CPT | Mod: 25

## 2022-12-21 PROCEDURE — 93010 ELECTROCARDIOGRAM REPORT: CPT | Mod: ,,, | Performed by: HOSPITALIST

## 2022-12-21 PROCEDURE — 96375 TX/PRO/DX INJ NEW DRUG ADDON: CPT

## 2022-12-21 PROCEDURE — 81001 URINALYSIS AUTO W/SCOPE: CPT | Performed by: NURSE PRACTITIONER

## 2022-12-21 PROCEDURE — 94761 N-INVAS EAR/PLS OXIMETRY MLT: CPT

## 2022-12-21 PROCEDURE — 93005 ELECTROCARDIOGRAM TRACING: CPT

## 2022-12-21 PROCEDURE — 81003 URINALYSIS AUTO W/O SCOPE: CPT | Performed by: NURSE PRACTITIONER

## 2022-12-21 PROCEDURE — 96365 THER/PROPH/DIAG IV INF INIT: CPT

## 2022-12-21 PROCEDURE — 83735 ASSAY OF MAGNESIUM: CPT | Performed by: NURSE PRACTITIONER

## 2022-12-21 PROCEDURE — 82077 ASSAY SPEC XCP UR&BREATH IA: CPT | Performed by: NURSE PRACTITIONER

## 2022-12-21 PROCEDURE — 80307 DRUG TEST PRSMV CHEM ANLYZR: CPT | Performed by: NURSE PRACTITIONER

## 2022-12-21 PROCEDURE — 99284 PR EMERGENCY DEPT VISIT,LEVEL IV: ICD-10-PCS | Mod: ,,, | Performed by: NURSE PRACTITIONER

## 2022-12-21 PROCEDURE — 83880 ASSAY OF NATRIURETIC PEPTIDE: CPT | Performed by: NURSE PRACTITIONER

## 2022-12-21 PROCEDURE — 80053 COMPREHEN METABOLIC PANEL: CPT | Performed by: NURSE PRACTITIONER

## 2022-12-21 PROCEDURE — 99284 EMERGENCY DEPT VISIT MOD MDM: CPT | Mod: ,,, | Performed by: NURSE PRACTITIONER

## 2022-12-21 RX ORDER — DEXAMETHASONE SODIUM PHOSPHATE 4 MG/ML
4 INJECTION, SOLUTION INTRA-ARTICULAR; INTRALESIONAL; INTRAMUSCULAR; INTRAVENOUS; SOFT TISSUE
Status: COMPLETED | OUTPATIENT
Start: 2022-12-22 | End: 2022-12-22

## 2022-12-21 RX ORDER — KETOROLAC TROMETHAMINE 30 MG/ML
15 INJECTION, SOLUTION INTRAMUSCULAR; INTRAVENOUS
Status: COMPLETED | OUTPATIENT
Start: 2022-12-22 | End: 2022-12-22

## 2022-12-22 VITALS
SYSTOLIC BLOOD PRESSURE: 109 MMHG | HEART RATE: 85 BPM | DIASTOLIC BLOOD PRESSURE: 77 MMHG | HEIGHT: 64 IN | TEMPERATURE: 98 F | BODY MASS INDEX: 28.17 KG/M2 | WEIGHT: 165 LBS | OXYGEN SATURATION: 99 % | RESPIRATION RATE: 18 BRPM

## 2022-12-22 PROCEDURE — 25000003 PHARM REV CODE 250: Performed by: NURSE PRACTITIONER

## 2022-12-22 PROCEDURE — 63600175 PHARM REV CODE 636 W HCPCS: Performed by: NURSE PRACTITIONER

## 2022-12-22 RX ORDER — CEFDINIR 300 MG/1
300 CAPSULE ORAL 2 TIMES DAILY
Qty: 20 CAPSULE | Refills: 0 | Status: SHIPPED | OUTPATIENT
Start: 2022-12-22 | End: 2023-01-01

## 2022-12-22 RX ADMIN — DEXAMETHASONE SODIUM PHOSPHATE 4 MG: 4 INJECTION, SOLUTION INTRA-ARTICULAR; INTRALESIONAL; INTRAMUSCULAR; INTRAVENOUS; SOFT TISSUE at 12:12

## 2022-12-22 RX ADMIN — CEFTRIAXONE 1 G: 1 INJECTION, POWDER, FOR SOLUTION INTRAMUSCULAR; INTRAVENOUS at 12:12

## 2022-12-22 RX ADMIN — KETOROLAC TROMETHAMINE 15 MG: 30 INJECTION, SOLUTION INTRAMUSCULAR at 12:12

## 2022-12-22 NOTE — ED TRIAGE NOTES
Patient presented to the ER c/o headache, dizziness and right ear pain that developed 30 minutes prior to arrival.

## 2022-12-22 NOTE — ED PROVIDER NOTES
Encounter Date: 12/21/2022       History     Chief Complaint   Patient presents with    Headache    Dizziness    Otalgia     Right     Presented with c/o onset headache and dizziness and right ear pain while sitting at home approx 30 min PTA and sinus congestion and drng for 2 days. Denies fever or chills, or cough or dyspnea. States took ASA initially for pain but pain is no better. Has PMH of CVA with right sided weakness and paralysis of right hand. Denies any new weakness or focal deficit. Denies n/v, fever or chills, or neck pain or stiffness. Denies head injury.    Review of patient's allergies indicates:  No Known Allergies  Past Medical History:   Diagnosis Date    Anticoagulant long-term use     Cancer     Migraine headache     Stroke      Past Surgical History:   Procedure Laterality Date    BREAST SURGERY      HYSTERECTOMY       History reviewed. No pertinent family history.  Social History     Tobacco Use    Smoking status: Never    Smokeless tobacco: Never   Substance Use Topics    Alcohol use: Never    Drug use: Never     Review of Systems   Constitutional:  Negative for activity change, appetite change and fever.   HENT:  Negative for congestion, sore throat and trouble swallowing.    Eyes:  Negative for photophobia and visual disturbance.   Respiratory:  Negative for cough and shortness of breath.    Cardiovascular:  Negative for chest pain, palpitations and leg swelling.   Gastrointestinal:  Negative for abdominal pain, diarrhea, nausea and vomiting.   Genitourinary:  Negative for difficulty urinating.   Musculoskeletal: Negative.    Skin: Negative.  Negative for rash.   Neurological:  Positive for headaches. Negative for speech difficulty.   Psychiatric/Behavioral: Negative.       Physical Exam     Initial Vitals [12/21/22 2129]   BP Pulse Resp Temp SpO2   123/72 109 20 98.4 °F (36.9 °C) 99 %      MAP       --         Physical Exam    Nursing note and vitals reviewed.  Constitutional: She appears  well-developed and well-nourished. No distress.   HENT:   Head: Normocephalic.   Right Ear: External ear normal. Tympanic membrane is erythematous. A middle ear effusion is present.   Left Ear: External ear normal. Tympanic membrane is erythematous. A middle ear effusion is present.   Nose: Mucosal edema, rhinorrhea and sinus tenderness present. Right sinus exhibits frontal sinus tenderness. Left sinus exhibits frontal sinus tenderness.   Mouth/Throat: Uvula is midline, oropharynx is clear and moist and mucous membranes are normal. No posterior oropharyngeal edema or posterior oropharyngeal erythema.   Eyes: Conjunctivae and EOM are normal. Pupils are equal, round, and reactive to light.   Neck: Neck supple.   Normal range of motion.  Cardiovascular:  Normal rate, regular rhythm and normal heart sounds.           Pulmonary/Chest: Breath sounds normal. No respiratory distress. She has no wheezes. She has no rhonchi. She has no rales. She exhibits no tenderness.   Abdominal: Abdomen is soft. Bowel sounds are normal. There is no abdominal tenderness.   Musculoskeletal:         General: No edema.      Cervical back: Normal range of motion and neck supple.     Neurological: She is alert and oriented to person, place, and time. GCS score is 15. GCS eye subscore is 4. GCS verbal subscore is 5. GCS motor subscore is 6.   Skin: Skin is warm and dry. Capillary refill takes less than 2 seconds.   Psychiatric: She has a normal mood and affect. Her behavior is normal. Judgment and thought content normal.       Medical Screening Exam   See Full Note    ED Course   Procedures  Labs Reviewed   COMPREHENSIVE METABOLIC PANEL - Abnormal; Notable for the following components:       Result Value    Calcium 8.3 (*)     Albumin 3.2 (*)     All other components within normal limits   URINALYSIS, REFLEX TO URINE CULTURE - Abnormal; Notable for the following components:    Leukocytes, UA Large (*)     All other components within normal  limits   CBC WITH DIFFERENTIAL - Abnormal; Notable for the following components:    MCH 31.5 (*)     Monocytes % 8.6 (*)     All other components within normal limits   URINALYSIS, MICROSCOPIC - Abnormal; Notable for the following components:    WBC, UA 15-25 (*)     Bacteria, UA Many (*)     Squamous Epithelial Cells, UA Few (*)     Amorphous Crystals, UA Many (*)     All other components within normal limits   DRUG SCREEN, URINE (BEAKER) - Normal    Narrative:     The results of screening tests should be considered presumptive. Confirmatory testing is available upon request.    Cutoff Points:  PCP:         25ng/mL  AMPH:        500ng/mL  MARGIE:        200ng/mL  CASTRO:        200ng/mL  THC:         50ng/mL  NOLA:         300ng/mL  OPI:         2000ng/mL   TROPONIN I - Normal   NT-PRO NATRIURETIC PEPTIDE - Normal   MAGNESIUM - Normal   CULTURE, URINE   CBC W/ AUTO DIFFERENTIAL    Narrative:     The following orders were created for panel order CBC auto differential.  Procedure                               Abnormality         Status                     ---------                               -----------         ------                     CBC with Differential[[id number]]        Abnormal            Final result                 Please view results for these tests on the individual orders.   ALCOHOL,MEDICAL (ETHANOL)     EKG Readings: (Independently Interpreted)   Rhythm: Normal Sinus Rhythm. Heart Rate: 94.   Reviewed at 2144.   ECG Results              EKG 12-lead (In process)  Result time 12/21/22 21:49:30      In process by Interface, Lab In [facility] (12/21/22 21:49:30)                   Narrative:    Test Reason : R42,    Vent. Rate : 094 BPM     Atrial Rate : 094 BPM     P-R Int : 146 ms          QRS Dur : 062 ms      QT Int : 352 ms       P-R-T Axes : 044 025 049 degrees     QTc Int : 440 ms    Normal sinus rhythm  Normal ECG  When compared with ECG of 14-JUN-2021 08:32,  No significant change was found    Referred  By:             Confirmed By:                                   Imaging Results              CT Head Without Contrast (In process)                      X-Ray Chest 1 View (In process)  Result time 12/21/22 22:25:10   Procedure changed from X-Ray Chest AP Portable                 X-Rays:   Independently Interpreted Readings:   Chest X-Ray: No acute process.   Head CT: Old infarct left temporal and frontal lobes and caudate nucleus. No acute event demonstrated.   Medications   cefTRIAXone (ROCEPHIN) 1 g in dextrose 5 % in water (D5W) 5 % 50 mL IVPB (MB+) (1 g Intravenous New Bag 12/22/22 0007)   dexAMETHasone injection 4 mg (4 mg Intravenous Given 12/22/22 0004)   ketorolac injection 15 mg (15 mg Intravenous Given 12/22/22 0005)     Medical Decision Making:   ED Management:  Labs unremarkable. CT head shows no acute findings. Pt has no new neurologic deficits. Frontal sinus tenderness with mucosal edema noted. Toradol, decadron and Rocephin given in the ED. States symptoms improved. Discharged home.           ED Course as of 12/22/22 0036   Wed Dec 21, 2022   2354 WBC, UA(!): 15-25 [DP]   2355 Bacteria, UA(!): Many [DP]   2355 Leukocytes, UA(!): Large [DP]   2355 WBC: 7.99 [DP]   2355 HEMOGLOBIN: 13.7 [DP]   2355 HEMATOCRIT: 38.6 [DP]   2355 Platelets: 261 [DP]      ED Course User Index  [DP] Pauline Perez NP          Clinical Impression:   Final diagnoses:  [R42] Dizziness  [J01.10] Acute frontal sinusitis, recurrence not specified (Primary)  [R51.9] Acute nonintractable headache, unspecified headache type        ED Disposition Condition    Discharge Stable          ED Prescriptions       Medication Sig Dispense Start Date End Date Auth. Provider    cefdinir (OMNICEF) 300 MG capsule Take 1 capsule (300 mg total) by mouth 2 (two) times daily. for 10 days 20 capsule 12/22/2022 1/1/2023 Pauline Perez NP          Follow-up Information       Follow up With Specialties Details Why Contact Info    Jose aBhena MD  Family Medicine In 1 day  [address] [facility] [place] [facility] [place]  [facility] [address]  [phone]      [facility] - Emergency Department Emergency Medicine  If symptoms worsen [address] [facility] [id number] [phone]             Pauline Perez NP  12/22/22 0036

## 2022-12-22 NOTE — DISCHARGE INSTRUCTIONS
Take Cefdinir as prescribed for all 10 days. Take Mucinex 600-1200 mg twice a day for 7 days. Use Flonase nasal spray 1 spray to each nostril daily.  Drink plenty of liquids. Follow-up with your PCP in 1-2 days. Return to the ED for new or worsening symptoms.

## 2022-12-24 LAB — UA COMPLETE W REFLEX CULTURE PNL UR: NORMAL
